# Patient Record
Sex: MALE | Race: WHITE | NOT HISPANIC OR LATINO | Employment: OTHER | URBAN - METROPOLITAN AREA
[De-identification: names, ages, dates, MRNs, and addresses within clinical notes are randomized per-mention and may not be internally consistent; named-entity substitution may affect disease eponyms.]

---

## 2018-10-21 ENCOUNTER — APPOINTMENT (OUTPATIENT)
Dept: RADIOLOGY | Facility: MEDICAL CENTER | Age: 78
DRG: 253 | End: 2018-10-21
Attending: EMERGENCY MEDICINE
Payer: MEDICARE

## 2018-10-21 ENCOUNTER — HOSPITAL ENCOUNTER (INPATIENT)
Facility: MEDICAL CENTER | Age: 78
LOS: 5 days | DRG: 253 | End: 2018-10-26
Attending: EMERGENCY MEDICINE | Admitting: SURGERY
Payer: MEDICARE

## 2018-10-21 ENCOUNTER — APPOINTMENT (OUTPATIENT)
Dept: RADIOLOGY | Facility: MEDICAL CENTER | Age: 78
DRG: 253 | End: 2018-10-21
Attending: SURGERY
Payer: MEDICARE

## 2018-10-21 DIAGNOSIS — I99.8 ISCHEMIC LEG: ICD-10-CM

## 2018-10-21 PROBLEM — E11.59 TYPE 2 DIABETES MELLITUS WITH CIRCULATORY DISORDER (HCC): Status: ACTIVE | Noted: 2018-10-21

## 2018-10-21 PROBLEM — J44.9 COPD (CHRONIC OBSTRUCTIVE PULMONARY DISEASE) (HCC): Status: ACTIVE | Noted: 2018-10-21

## 2018-10-21 PROBLEM — E11.9 DIABETES (HCC): Status: ACTIVE | Noted: 2018-10-21

## 2018-10-21 PROBLEM — E66.9 OBESITY: Status: ACTIVE | Noted: 2018-10-21

## 2018-10-21 PROBLEM — I48.91 ATRIAL FIBRILLATION (HCC): Status: ACTIVE | Noted: 2018-10-21

## 2018-10-21 PROBLEM — I48.0 PAROXYSMAL ATRIAL FIBRILLATION (HCC): Status: ACTIVE | Noted: 2018-10-21

## 2018-10-21 PROBLEM — I25.10 CORONARY ARTERY DISEASE: Status: ACTIVE | Noted: 2018-10-21

## 2018-10-21 PROBLEM — I10 HYPERTENSION: Status: ACTIVE | Noted: 2018-10-21

## 2018-10-21 PROBLEM — E87.20 LACTIC ACIDOSIS: Status: ACTIVE | Noted: 2018-10-21

## 2018-10-21 LAB
ALBUMIN SERPL BCP-MCNC: 3.9 G/DL (ref 3.2–4.9)
ALBUMIN/GLOB SERPL: 1.3 G/DL
ALP SERPL-CCNC: 107 U/L (ref 30–99)
ALT SERPL-CCNC: 21 U/L (ref 2–50)
ANION GAP SERPL CALC-SCNC: 9 MMOL/L (ref 0–11.9)
APTT PPP: 27.7 SEC (ref 24.7–36)
APTT PPP: 70.5 SEC (ref 24.7–36)
APTT PPP: 82.5 SEC (ref 24.7–36)
AST SERPL-CCNC: 19 U/L (ref 12–45)
BASOPHILS # BLD AUTO: 0.2 % (ref 0–1.8)
BASOPHILS # BLD: 0.02 K/UL (ref 0–0.12)
BILIRUB SERPL-MCNC: 0.7 MG/DL (ref 0.1–1.5)
BUN SERPL-MCNC: 24 MG/DL (ref 8–22)
CALCIUM SERPL-MCNC: 9.3 MG/DL (ref 8.5–10.5)
CHLORIDE SERPL-SCNC: 103 MMOL/L (ref 96–112)
CO2 SERPL-SCNC: 24 MMOL/L (ref 20–33)
CREAT SERPL-MCNC: 1.16 MG/DL (ref 0.5–1.4)
EKG IMPRESSION: NORMAL
EOSINOPHIL # BLD AUTO: 0.02 K/UL (ref 0–0.51)
EOSINOPHIL NFR BLD: 0.2 % (ref 0–6.9)
ERYTHROCYTE [DISTWIDTH] IN BLOOD BY AUTOMATED COUNT: 45.8 FL (ref 35.9–50)
GLOBULIN SER CALC-MCNC: 3 G/DL (ref 1.9–3.5)
GLUCOSE BLD-MCNC: 125 MG/DL (ref 65–99)
GLUCOSE BLD-MCNC: 157 MG/DL (ref 65–99)
GLUCOSE BLD-MCNC: 159 MG/DL (ref 65–99)
GLUCOSE SERPL-MCNC: 269 MG/DL (ref 65–99)
HCT VFR BLD AUTO: 43.1 % (ref 42–52)
HGB BLD-MCNC: 14.5 G/DL (ref 14–18)
IMM GRANULOCYTES # BLD AUTO: 0.05 K/UL (ref 0–0.11)
IMM GRANULOCYTES NFR BLD AUTO: 0.6 % (ref 0–0.9)
INR PPP: 1.06 (ref 0.87–1.13)
LACTATE BLD-SCNC: 2.6 MMOL/L (ref 0.5–2)
LYMPHOCYTES # BLD AUTO: 1.13 K/UL (ref 1–4.8)
LYMPHOCYTES NFR BLD: 13.3 % (ref 22–41)
MCH RBC QN AUTO: 32.6 PG (ref 27–33)
MCHC RBC AUTO-ENTMCNC: 33.6 G/DL (ref 33.7–35.3)
MCV RBC AUTO: 96.9 FL (ref 81.4–97.8)
MONOCYTES # BLD AUTO: 0.41 K/UL (ref 0–0.85)
MONOCYTES NFR BLD AUTO: 4.8 % (ref 0–13.4)
NEUTROPHILS # BLD AUTO: 6.87 K/UL (ref 1.82–7.42)
NEUTROPHILS NFR BLD: 80.9 % (ref 44–72)
NRBC # BLD AUTO: 0 K/UL
NRBC BLD-RTO: 0 /100 WBC
PLATELET # BLD AUTO: 139 K/UL (ref 164–446)
PMV BLD AUTO: 9.9 FL (ref 9–12.9)
POTASSIUM SERPL-SCNC: 4.6 MMOL/L (ref 3.6–5.5)
PROT SERPL-MCNC: 6.9 G/DL (ref 6–8.2)
PROTHROMBIN TIME: 13.9 SEC (ref 12–14.6)
RBC # BLD AUTO: 4.45 M/UL (ref 4.7–6.1)
SODIUM SERPL-SCNC: 136 MMOL/L (ref 135–145)
TROPONIN I SERPL-MCNC: <0.01 NG/ML (ref 0–0.04)
WBC # BLD AUTO: 8.5 K/UL (ref 4.8–10.8)

## 2018-10-21 PROCEDURE — 93005 ELECTROCARDIOGRAM TRACING: CPT | Performed by: EMERGENCY MEDICINE

## 2018-10-21 PROCEDURE — 501838 HCHG SUTURE GENERAL: Performed by: SURGERY

## 2018-10-21 PROCEDURE — 160048 HCHG OR STATISTICAL LEVEL 1-5: Performed by: SURGERY

## 2018-10-21 PROCEDURE — 700111 HCHG RX REV CODE 636 W/ 250 OVERRIDE (IP)

## 2018-10-21 PROCEDURE — 99291 CRITICAL CARE FIRST HOUR: CPT

## 2018-10-21 PROCEDURE — 93926 LOWER EXTREMITY STUDY: CPT | Mod: RT

## 2018-10-21 PROCEDURE — 501837 HCHG SUTURE CV: Performed by: SURGERY

## 2018-10-21 PROCEDURE — 04CK0ZZ EXTIRPATION OF MATTER FROM RIGHT FEMORAL ARTERY, OPEN APPROACH: ICD-10-PCS | Performed by: SURGERY

## 2018-10-21 PROCEDURE — 71045 X-RAY EXAM CHEST 1 VIEW: CPT

## 2018-10-21 PROCEDURE — 500002 HCHG ADHESIVE, DERMABOND: Performed by: SURGERY

## 2018-10-21 PROCEDURE — 85610 PROTHROMBIN TIME: CPT

## 2018-10-21 PROCEDURE — 503339 HCHG DRESSSING PICO: Performed by: SURGERY

## 2018-10-21 PROCEDURE — 160039 HCHG SURGERY MINUTES - EA ADDL 1 MIN LEVEL 3: Performed by: SURGERY

## 2018-10-21 PROCEDURE — 96374 THER/PROPH/DIAG INJ IV PUSH: CPT

## 2018-10-21 PROCEDURE — 700111 HCHG RX REV CODE 636 W/ 250 OVERRIDE (IP): Performed by: EMERGENCY MEDICINE

## 2018-10-21 PROCEDURE — 303105 HCHG CATHETER EXTRA

## 2018-10-21 PROCEDURE — 160009 HCHG ANES TIME/MIN: Performed by: SURGERY

## 2018-10-21 PROCEDURE — 36415 COLL VENOUS BLD VENIPUNCTURE: CPT

## 2018-10-21 PROCEDURE — 73600 X-RAY EXAM OF ANKLE: CPT | Mod: RT

## 2018-10-21 PROCEDURE — 700111 HCHG RX REV CODE 636 W/ 250 OVERRIDE (IP): Performed by: SURGERY

## 2018-10-21 PROCEDURE — 700102 HCHG RX REV CODE 250 W/ 637 OVERRIDE(OP): Performed by: SURGERY

## 2018-10-21 PROCEDURE — 770020 HCHG ROOM/CARE - TELE (206)

## 2018-10-21 PROCEDURE — A9270 NON-COVERED ITEM OR SERVICE: HCPCS | Performed by: SURGERY

## 2018-10-21 PROCEDURE — 84484 ASSAY OF TROPONIN QUANT: CPT

## 2018-10-21 PROCEDURE — 700105 HCHG RX REV CODE 258: Performed by: SURGERY

## 2018-10-21 PROCEDURE — B41F1ZZ FLUOROSCOPY OF RIGHT LOWER EXTREMITY ARTERIES USING LOW OSMOLAR CONTRAST: ICD-10-PCS | Performed by: SURGERY

## 2018-10-21 PROCEDURE — 85730 THROMBOPLASTIN TIME PARTIAL: CPT

## 2018-10-21 PROCEDURE — 700112 HCHG RX REV CODE 229: Performed by: SURGERY

## 2018-10-21 PROCEDURE — 51702 INSERT TEMP BLADDER CATH: CPT

## 2018-10-21 PROCEDURE — 160036 HCHG PACU - EA ADDL 30 MINS PHASE I: Performed by: SURGERY

## 2018-10-21 PROCEDURE — 502594 HCHG SCISSOR HANDLE, HARMONIC ACE: Performed by: SURGERY

## 2018-10-21 PROCEDURE — 94760 N-INVAS EAR/PLS OXIMETRY 1: CPT

## 2018-10-21 PROCEDURE — 160028 HCHG SURGERY MINUTES - 1ST 30 MINS LEVEL 3: Performed by: SURGERY

## 2018-10-21 PROCEDURE — C1757 CATH, THROMBECTOMY/EMBOLECT: HCPCS | Performed by: SURGERY

## 2018-10-21 PROCEDURE — 700101 HCHG RX REV CODE 250

## 2018-10-21 PROCEDURE — 99223 1ST HOSP IP/OBS HIGH 75: CPT | Performed by: INTERNAL MEDICINE

## 2018-10-21 PROCEDURE — 82962 GLUCOSE BLOOD TEST: CPT | Mod: 91

## 2018-10-21 PROCEDURE — 04CM0ZZ EXTIRPATION OF MATTER FROM RIGHT POPLITEAL ARTERY, OPEN APPROACH: ICD-10-PCS | Performed by: SURGERY

## 2018-10-21 PROCEDURE — C1725 CATH, TRANSLUMIN NON-LASER: HCPCS | Performed by: SURGERY

## 2018-10-21 PROCEDURE — 80053 COMPREHEN METABOLIC PANEL: CPT

## 2018-10-21 PROCEDURE — 83605 ASSAY OF LACTIC ACID: CPT

## 2018-10-21 PROCEDURE — 73562 X-RAY EXAM OF KNEE 3: CPT | Mod: RT

## 2018-10-21 PROCEDURE — 160035 HCHG PACU - 1ST 60 MINS PHASE I: Performed by: SURGERY

## 2018-10-21 PROCEDURE — 160002 HCHG RECOVERY MINUTES (STAT): Performed by: SURGERY

## 2018-10-21 PROCEDURE — 700102 HCHG RX REV CODE 250 W/ 637 OVERRIDE(OP): Performed by: INTERNAL MEDICINE

## 2018-10-21 PROCEDURE — 304561 HCHG STAT O2

## 2018-10-21 PROCEDURE — 501445 HCHG STAPLER, SKIN DISP: Performed by: SURGERY

## 2018-10-21 PROCEDURE — 85025 COMPLETE CBC W/AUTO DIFF WBC: CPT

## 2018-10-21 PROCEDURE — A9270 NON-COVERED ITEM OR SERVICE: HCPCS | Performed by: INTERNAL MEDICINE

## 2018-10-21 RX ORDER — DIGOXIN 125 MCG
125 TABLET ORAL EVERY MORNING
Status: ON HOLD | COMMUNITY
End: 2019-11-10

## 2018-10-21 RX ORDER — OMEPRAZOLE 20 MG/1
40 CAPSULE, DELAYED RELEASE ORAL
Status: DISCONTINUED | OUTPATIENT
Start: 2018-10-21 | End: 2018-10-26 | Stop reason: HOSPADM

## 2018-10-21 RX ORDER — WARFARIN SODIUM 2 MG/1
2-2.5 TABLET ORAL EVERY EVENING
Status: ON HOLD | COMMUNITY
End: 2018-10-26

## 2018-10-21 RX ORDER — HEPARIN SODIUM,PORCINE 1000/ML
VIAL (ML) INJECTION
Status: DISCONTINUED | OUTPATIENT
Start: 2018-10-21 | End: 2018-10-21 | Stop reason: HOSPADM

## 2018-10-21 RX ORDER — TRAZODONE HYDROCHLORIDE 50 MG/1
50 TABLET ORAL
COMMUNITY
End: 2019-04-17

## 2018-10-21 RX ORDER — ALBUTEROL SULFATE 90 UG/1
2 AEROSOL, METERED RESPIRATORY (INHALATION) EVERY 4 HOURS PRN
Status: DISCONTINUED | OUTPATIENT
Start: 2018-10-21 | End: 2018-10-26 | Stop reason: HOSPADM

## 2018-10-21 RX ORDER — HYDRALAZINE HYDROCHLORIDE 20 MG/ML
10 INJECTION INTRAMUSCULAR; INTRAVENOUS EVERY 4 HOURS PRN
Status: DISCONTINUED | OUTPATIENT
Start: 2018-10-21 | End: 2018-10-26 | Stop reason: HOSPADM

## 2018-10-21 RX ORDER — DEXTROSE MONOHYDRATE 25 G/50ML
25 INJECTION, SOLUTION INTRAVENOUS
Status: DISCONTINUED | OUTPATIENT
Start: 2018-10-21 | End: 2018-10-21

## 2018-10-21 RX ORDER — ONDANSETRON 4 MG/1
4 TABLET, ORALLY DISINTEGRATING ORAL EVERY 4 HOURS PRN
Status: DISCONTINUED | OUTPATIENT
Start: 2018-10-21 | End: 2018-10-26 | Stop reason: HOSPADM

## 2018-10-21 RX ORDER — MEPERIDINE HYDROCHLORIDE 25 MG/ML
12.5 INJECTION INTRAMUSCULAR; INTRAVENOUS; SUBCUTANEOUS
Status: DISCONTINUED | OUTPATIENT
Start: 2018-10-21 | End: 2018-10-21 | Stop reason: HOSPADM

## 2018-10-21 RX ORDER — DIPHENHYDRAMINE HYDROCHLORIDE 50 MG/ML
12.5 INJECTION INTRAMUSCULAR; INTRAVENOUS
Status: DISCONTINUED | OUTPATIENT
Start: 2018-10-21 | End: 2018-10-21 | Stop reason: HOSPADM

## 2018-10-21 RX ORDER — POLYETHYLENE GLYCOL 3350 17 G/17G
1 POWDER, FOR SOLUTION ORAL
Status: DISCONTINUED | OUTPATIENT
Start: 2018-10-21 | End: 2018-10-26 | Stop reason: HOSPADM

## 2018-10-21 RX ORDER — METOPROLOL TARTRATE 50 MG/1
50 TABLET, FILM COATED ORAL TWICE DAILY
Status: DISCONTINUED | OUTPATIENT
Start: 2018-10-21 | End: 2018-10-24

## 2018-10-21 RX ORDER — RANITIDINE 150 MG/1
150 TABLET ORAL 2 TIMES DAILY
Status: ON HOLD | COMMUNITY
End: 2019-11-10

## 2018-10-21 RX ORDER — OXYCODONE HCL 5 MG/5 ML
5 SOLUTION, ORAL ORAL
Status: DISCONTINUED | OUTPATIENT
Start: 2018-10-21 | End: 2018-10-21 | Stop reason: HOSPADM

## 2018-10-21 RX ORDER — ONDANSETRON 2 MG/ML
4 INJECTION INTRAMUSCULAR; INTRAVENOUS EVERY 4 HOURS PRN
Status: DISCONTINUED | OUTPATIENT
Start: 2018-10-21 | End: 2018-10-26 | Stop reason: HOSPADM

## 2018-10-21 RX ORDER — TRAZODONE HYDROCHLORIDE 50 MG/1
50 TABLET ORAL
Status: DISCONTINUED | OUTPATIENT
Start: 2018-10-21 | End: 2018-10-26 | Stop reason: HOSPADM

## 2018-10-21 RX ORDER — METOPROLOL TARTRATE 50 MG/1
50 TABLET, FILM COATED ORAL 2 TIMES DAILY
Status: ON HOLD | COMMUNITY
End: 2018-10-26

## 2018-10-21 RX ORDER — LISINOPRIL 10 MG/1
5 TABLET ORAL
Status: DISCONTINUED | OUTPATIENT
Start: 2018-10-21 | End: 2018-10-24

## 2018-10-21 RX ORDER — GLIMEPIRIDE 4 MG/1
4 TABLET ORAL
Status: DISCONTINUED | OUTPATIENT
Start: 2018-10-21 | End: 2018-10-26 | Stop reason: HOSPADM

## 2018-10-21 RX ORDER — BACITRACIN 50000 [IU]/1
INJECTION, POWDER, FOR SOLUTION INTRAMUSCULAR
Status: DISCONTINUED | OUTPATIENT
Start: 2018-10-21 | End: 2018-10-21 | Stop reason: HOSPADM

## 2018-10-21 RX ORDER — OXYCODONE HYDROCHLORIDE 5 MG/1
5 TABLET ORAL
Status: DISCONTINUED | OUTPATIENT
Start: 2018-10-21 | End: 2018-10-21 | Stop reason: HOSPADM

## 2018-10-21 RX ORDER — HEPARIN SODIUM 1000 [USP'U]/ML
3800 INJECTION, SOLUTION INTRAVENOUS; SUBCUTANEOUS PRN
Status: DISCONTINUED | OUTPATIENT
Start: 2018-10-21 | End: 2018-10-21

## 2018-10-21 RX ORDER — HEPARIN SODIUM 1000 [USP'U]/ML
7000 INJECTION, SOLUTION INTRAVENOUS; SUBCUTANEOUS ONCE
Status: DISCONTINUED | OUTPATIENT
Start: 2018-10-21 | End: 2018-10-21

## 2018-10-21 RX ORDER — OXYCODONE HYDROCHLORIDE 5 MG/1
5 TABLET ORAL
Status: DISCONTINUED | OUTPATIENT
Start: 2018-10-21 | End: 2018-10-26 | Stop reason: HOSPADM

## 2018-10-21 RX ORDER — CEFAZOLIN SODIUM 2 G/100ML
2 INJECTION, SOLUTION INTRAVENOUS EVERY 8 HOURS
Status: COMPLETED | OUTPATIENT
Start: 2018-10-21 | End: 2018-10-22

## 2018-10-21 RX ORDER — OMEPRAZOLE 20 MG/1
40 CAPSULE, DELAYED RELEASE ORAL
Status: ON HOLD | COMMUNITY
End: 2019-11-10

## 2018-10-21 RX ORDER — HYDROMORPHONE HYDROCHLORIDE 2 MG/ML
0.2 INJECTION, SOLUTION INTRAMUSCULAR; INTRAVENOUS; SUBCUTANEOUS
Status: DISCONTINUED | OUTPATIENT
Start: 2018-10-21 | End: 2018-10-21 | Stop reason: HOSPADM

## 2018-10-21 RX ORDER — DOCUSATE SODIUM 100 MG/1
100 CAPSULE, LIQUID FILLED ORAL 2 TIMES DAILY
Status: DISCONTINUED | OUTPATIENT
Start: 2018-10-21 | End: 2018-10-26 | Stop reason: HOSPADM

## 2018-10-21 RX ORDER — HALOPERIDOL 5 MG/ML
1 INJECTION INTRAMUSCULAR EVERY 6 HOURS PRN
Status: DISCONTINUED | OUTPATIENT
Start: 2018-10-21 | End: 2018-10-26 | Stop reason: HOSPADM

## 2018-10-21 RX ORDER — GLIMEPIRIDE 4 MG/1
4 TABLET ORAL 2 TIMES DAILY
COMMUNITY
End: 2019-04-17

## 2018-10-21 RX ORDER — LISINOPRIL 5 MG/1
5 TABLET ORAL EVERY MORNING
Status: ON HOLD | COMMUNITY
End: 2018-10-26

## 2018-10-21 RX ORDER — SCOLOPAMINE TRANSDERMAL SYSTEM 1 MG/1
1 PATCH, EXTENDED RELEASE TRANSDERMAL
Status: DISCONTINUED | OUTPATIENT
Start: 2018-10-21 | End: 2018-10-26 | Stop reason: HOSPADM

## 2018-10-21 RX ORDER — SODIUM CHLORIDE, SODIUM LACTATE, POTASSIUM CHLORIDE, CALCIUM CHLORIDE 600; 310; 30; 20 MG/100ML; MG/100ML; MG/100ML; MG/100ML
INJECTION, SOLUTION INTRAVENOUS CONTINUOUS
Status: DISCONTINUED | OUTPATIENT
Start: 2018-10-21 | End: 2018-10-24

## 2018-10-21 RX ORDER — DEXAMETHASONE SODIUM PHOSPHATE 4 MG/ML
4 INJECTION, SOLUTION INTRA-ARTICULAR; INTRALESIONAL; INTRAMUSCULAR; INTRAVENOUS; SOFT TISSUE
Status: DISCONTINUED | OUTPATIENT
Start: 2018-10-21 | End: 2018-10-26 | Stop reason: HOSPADM

## 2018-10-21 RX ORDER — ACETAMINOPHEN 325 MG/1
650 TABLET ORAL EVERY 6 HOURS PRN
Status: DISCONTINUED | OUTPATIENT
Start: 2018-10-21 | End: 2018-10-21

## 2018-10-21 RX ORDER — MORPHINE SULFATE 4 MG/ML
4 INJECTION, SOLUTION INTRAMUSCULAR; INTRAVENOUS
Status: DISCONTINUED | OUTPATIENT
Start: 2018-10-21 | End: 2018-10-26 | Stop reason: HOSPADM

## 2018-10-21 RX ORDER — BISACODYL 10 MG
10 SUPPOSITORY, RECTAL RECTAL
Status: DISCONTINUED | OUTPATIENT
Start: 2018-10-21 | End: 2018-10-26 | Stop reason: HOSPADM

## 2018-10-21 RX ORDER — MORPHINE SULFATE 4 MG/ML
1-2 INJECTION, SOLUTION INTRAMUSCULAR; INTRAVENOUS
Status: DISCONTINUED | OUTPATIENT
Start: 2018-10-21 | End: 2018-10-24

## 2018-10-21 RX ORDER — ONDANSETRON 2 MG/ML
4 INJECTION INTRAMUSCULAR; INTRAVENOUS
Status: DISCONTINUED | OUTPATIENT
Start: 2018-10-21 | End: 2018-10-21 | Stop reason: HOSPADM

## 2018-10-21 RX ORDER — ONDANSETRON 2 MG/ML
4 INJECTION INTRAMUSCULAR; INTRAVENOUS EVERY 4 HOURS PRN
Status: DISCONTINUED | OUTPATIENT
Start: 2018-10-21 | End: 2018-10-21

## 2018-10-21 RX ORDER — SODIUM CHLORIDE, SODIUM LACTATE, POTASSIUM CHLORIDE, CALCIUM CHLORIDE 600; 310; 30; 20 MG/100ML; MG/100ML; MG/100ML; MG/100ML
INJECTION, SOLUTION INTRAVENOUS CONTINUOUS
Status: DISCONTINUED | OUTPATIENT
Start: 2018-10-21 | End: 2018-10-21 | Stop reason: HOSPADM

## 2018-10-21 RX ORDER — AMOXICILLIN 250 MG
2 CAPSULE ORAL 2 TIMES DAILY
Status: DISCONTINUED | OUTPATIENT
Start: 2018-10-21 | End: 2018-10-26 | Stop reason: HOSPADM

## 2018-10-21 RX ORDER — DIGOXIN 125 MCG
125 TABLET ORAL DAILY
Status: DISCONTINUED | OUTPATIENT
Start: 2018-10-21 | End: 2018-10-26 | Stop reason: HOSPADM

## 2018-10-21 RX ORDER — OXYCODONE HCL 5 MG/5 ML
10 SOLUTION, ORAL ORAL
Status: DISCONTINUED | OUTPATIENT
Start: 2018-10-21 | End: 2018-10-21 | Stop reason: HOSPADM

## 2018-10-21 RX ORDER — HALOPERIDOL 5 MG/ML
1 INJECTION INTRAMUSCULAR
Status: DISCONTINUED | OUTPATIENT
Start: 2018-10-21 | End: 2018-10-21 | Stop reason: HOSPADM

## 2018-10-21 RX ORDER — FINASTERIDE 5 MG/1
5 TABLET, FILM COATED ORAL DAILY
Status: DISCONTINUED | OUTPATIENT
Start: 2018-10-21 | End: 2018-10-26 | Stop reason: HOSPADM

## 2018-10-21 RX ORDER — DEXTROSE MONOHYDRATE 25 G/50ML
25 INJECTION, SOLUTION INTRAVENOUS
Status: DISCONTINUED | OUTPATIENT
Start: 2018-10-21 | End: 2018-10-26 | Stop reason: HOSPADM

## 2018-10-21 RX ORDER — LABETALOL HYDROCHLORIDE 5 MG/ML
10 INJECTION, SOLUTION INTRAVENOUS EVERY 4 HOURS PRN
Status: DISCONTINUED | OUTPATIENT
Start: 2018-10-21 | End: 2018-10-26 | Stop reason: HOSPADM

## 2018-10-21 RX ORDER — BUPIVACAINE HYDROCHLORIDE AND EPINEPHRINE 5; 5 MG/ML; UG/ML
INJECTION, SOLUTION EPIDURAL; INTRACAUDAL; PERINEURAL
Status: DISCONTINUED | OUTPATIENT
Start: 2018-10-21 | End: 2018-10-21 | Stop reason: HOSPADM

## 2018-10-21 RX ORDER — HYDROCODONE BITARTRATE AND ACETAMINOPHEN 5; 325 MG/1; MG/1
1-2 TABLET ORAL EVERY 6 HOURS PRN
Status: DISCONTINUED | OUTPATIENT
Start: 2018-10-21 | End: 2018-10-21

## 2018-10-21 RX ORDER — ACETAMINOPHEN 325 MG/1
325 TABLET ORAL EVERY 4 HOURS PRN
Status: DISCONTINUED | OUTPATIENT
Start: 2018-10-21 | End: 2018-10-26 | Stop reason: HOSPADM

## 2018-10-21 RX ORDER — FINASTERIDE 5 MG/1
5 TABLET, FILM COATED ORAL EVERY MORNING
COMMUNITY

## 2018-10-21 RX ORDER — HYDROMORPHONE HYDROCHLORIDE 2 MG/ML
0.1 INJECTION, SOLUTION INTRAMUSCULAR; INTRAVENOUS; SUBCUTANEOUS
Status: DISCONTINUED | OUTPATIENT
Start: 2018-10-21 | End: 2018-10-21 | Stop reason: HOSPADM

## 2018-10-21 RX ORDER — TRAMADOL HYDROCHLORIDE 50 MG/1
50-100 TABLET ORAL 3 TIMES DAILY PRN
COMMUNITY
End: 2018-12-17

## 2018-10-21 RX ORDER — OXYCODONE HYDROCHLORIDE 5 MG/1
10 TABLET ORAL
Status: DISCONTINUED | OUTPATIENT
Start: 2018-10-21 | End: 2018-10-21 | Stop reason: HOSPADM

## 2018-10-21 RX ORDER — INSULIN GLARGINE 100 [IU]/ML
20 INJECTION, SOLUTION SUBCUTANEOUS EVERY EVENING
Status: DISCONTINUED | OUTPATIENT
Start: 2018-10-21 | End: 2018-10-26 | Stop reason: HOSPADM

## 2018-10-21 RX ORDER — HYDROMORPHONE HYDROCHLORIDE 2 MG/ML
0.4 INJECTION, SOLUTION INTRAMUSCULAR; INTRAVENOUS; SUBCUTANEOUS
Status: DISCONTINUED | OUTPATIENT
Start: 2018-10-21 | End: 2018-10-21 | Stop reason: HOSPADM

## 2018-10-21 RX ORDER — OXYCODONE HYDROCHLORIDE 5 MG/1
10 TABLET ORAL
Status: DISCONTINUED | OUTPATIENT
Start: 2018-10-21 | End: 2018-10-26 | Stop reason: HOSPADM

## 2018-10-21 RX ADMIN — DIGOXIN 125 MCG: 125 TABLET ORAL at 17:34

## 2018-10-21 RX ADMIN — HEPARIN SODIUM 1450 UNITS/HR: 5000 INJECTION, SOLUTION INTRAVENOUS at 21:26

## 2018-10-21 RX ADMIN — OXYCODONE HYDROCHLORIDE 10 MG: 5 TABLET ORAL at 21:11

## 2018-10-21 RX ADMIN — MORPHINE SULFATE 2 MG: 4 INJECTION INTRAVENOUS at 10:01

## 2018-10-21 RX ADMIN — SODIUM CHLORIDE, POTASSIUM CHLORIDE, SODIUM LACTATE AND CALCIUM CHLORIDE: 600; 310; 30; 20 INJECTION, SOLUTION INTRAVENOUS at 21:26

## 2018-10-21 RX ADMIN — OXYCODONE HYDROCHLORIDE 10 MG: 5 TABLET ORAL at 09:30

## 2018-10-21 RX ADMIN — CEFAZOLIN SODIUM 2 G: 2 INJECTION, SOLUTION INTRAVENOUS at 21:10

## 2018-10-21 RX ADMIN — INSULIN GLARGINE 20 UNITS: 100 INJECTION, SOLUTION SUBCUTANEOUS at 18:38

## 2018-10-21 RX ADMIN — INSULIN HUMAN 2 UNITS: 100 INJECTION, SOLUTION PARENTERAL at 17:29

## 2018-10-21 RX ADMIN — MORPHINE SULFATE 2 MG: 4 INJECTION INTRAVENOUS at 12:57

## 2018-10-21 RX ADMIN — GLIMEPIRIDE 4 MG: 4 TABLET ORAL at 13:09

## 2018-10-21 RX ADMIN — THERA TABS 1 TABLET: TAB at 12:59

## 2018-10-21 RX ADMIN — MORPHINE SULFATE 2 MG: 4 INJECTION INTRAVENOUS at 21:11

## 2018-10-21 RX ADMIN — SODIUM CHLORIDE, POTASSIUM CHLORIDE, SODIUM LACTATE AND CALCIUM CHLORIDE: 600; 310; 30; 20 INJECTION, SOLUTION INTRAVENOUS at 09:54

## 2018-10-21 RX ADMIN — LISINOPRIL 5 MG: 10 TABLET ORAL at 12:59

## 2018-10-21 RX ADMIN — INSULIN HUMAN 2 UNITS: 100 INJECTION, SOLUTION PARENTERAL at 21:07

## 2018-10-21 RX ADMIN — CEFAZOLIN SODIUM 2 G: 2 INJECTION, SOLUTION INTRAVENOUS at 12:59

## 2018-10-21 RX ADMIN — HEPARIN SODIUM 1450 UNITS: 5000 INJECTION, SOLUTION INTRAVENOUS at 02:27

## 2018-10-21 RX ADMIN — MORPHINE SULFATE 2 MG: 4 INJECTION INTRAVENOUS at 17:39

## 2018-10-21 RX ADMIN — DOCUSATE SODIUM 100 MG: 100 CAPSULE, LIQUID FILLED ORAL at 17:34

## 2018-10-21 RX ADMIN — MORPHINE SULFATE 2 MG: 4 INJECTION INTRAVENOUS at 14:52

## 2018-10-21 RX ADMIN — OMEPRAZOLE 40 MG: 20 CAPSULE, DELAYED RELEASE ORAL at 12:59

## 2018-10-21 RX ADMIN — FINASTERIDE 5 MG: 5 TABLET, FILM COATED ORAL at 12:59

## 2018-10-21 RX ADMIN — OXYCODONE HYDROCHLORIDE 10 MG: 5 TABLET ORAL at 17:34

## 2018-10-21 RX ADMIN — METOPROLOL TARTRATE 50 MG: 50 TABLET ORAL at 12:59

## 2018-10-21 RX ADMIN — HEPARIN SODIUM 1450 UNITS/HR: 5000 INJECTION, SOLUTION INTRAVENOUS at 09:54

## 2018-10-21 RX ADMIN — OXYCODONE HYDROCHLORIDE 10 MG: 5 TABLET ORAL at 14:52

## 2018-10-21 ASSESSMENT — PAIN SCALES - GENERAL
PAINLEVEL_OUTOF10: 0
PAINLEVEL_OUTOF10: 8
PAINLEVEL_OUTOF10: 0
PAINLEVEL_OUTOF10: 0
PAINLEVEL_OUTOF10: 7
PAINLEVEL_OUTOF10: 0
PAINLEVEL_OUTOF10: 8
PAINLEVEL_OUTOF10: 0
PAINLEVEL_OUTOF10: 0

## 2018-10-21 ASSESSMENT — ENCOUNTER SYMPTOMS
SHORTNESS OF BREATH: 0
ABDOMINAL PAIN: 0
EYE PAIN: 0
STRIDOR: 0
NECK PAIN: 0
COUGH: 0
DIARRHEA: 0
SPUTUM PRODUCTION: 0
SEIZURES: 0
INSOMNIA: 0
HEADACHES: 0
EYE DISCHARGE: 0
MYALGIAS: 0
BACK PAIN: 0
NERVOUS/ANXIOUS: 0
NAUSEA: 0
ORTHOPNEA: 0
DEPRESSION: 0
EYE REDNESS: 0
HEARTBURN: 0
PALPITATIONS: 0
WEIGHT LOSS: 0
DIZZINESS: 0
CHILLS: 0
FOCAL WEAKNESS: 0
VOMITING: 0
FEVER: 0
BLURRED VISION: 0

## 2018-10-21 ASSESSMENT — PATIENT HEALTH QUESTIONNAIRE - PHQ9
SUM OF ALL RESPONSES TO PHQ9 QUESTIONS 1 AND 2: 0
2. FEELING DOWN, DEPRESSED, IRRITABLE, OR HOPELESS: NOT AT ALL
1. LITTLE INTEREST OR PLEASURE IN DOING THINGS: NOT AT ALL
1. LITTLE INTEREST OR PLEASURE IN DOING THINGS: NOT AT ALL
2. FEELING DOWN, DEPRESSED, IRRITABLE, OR HOPELESS: NOT AT ALL
SUM OF ALL RESPONSES TO PHQ9 QUESTIONS 1 AND 2: 0

## 2018-10-21 ASSESSMENT — COPD QUESTIONNAIRES
DO YOU EVER COUGH UP ANY MUCUS OR PHLEGM?: YES, EVERY DAY
COPD SCREENING SCORE: 9
HAVE YOU SMOKED AT LEAST 100 CIGARETTES IN YOUR ENTIRE LIFE: YES
DURING THE PAST 4 WEEKS HOW MUCH DID YOU FEEL SHORT OF BREATH: MOST  OR ALL OF THE TIME

## 2018-10-21 ASSESSMENT — COGNITIVE AND FUNCTIONAL STATUS - GENERAL
MOBILITY SCORE: 21
CLIMB 3 TO 5 STEPS WITH RAILING: A LITTLE
SUGGESTED CMS G CODE MODIFIER MOBILITY: CJ
STANDING UP FROM CHAIR USING ARMS: A LITTLE
SUGGESTED CMS G CODE MODIFIER DAILY ACTIVITY: CJ
DAILY ACTIVITIY SCORE: 20
HELP NEEDED FOR BATHING: A LITTLE
TOILETING: A LITTLE
WALKING IN HOSPITAL ROOM: A LITTLE
DRESSING REGULAR LOWER BODY CLOTHING: A LITTLE
DRESSING REGULAR UPPER BODY CLOTHING: A LITTLE

## 2018-10-21 ASSESSMENT — LIFESTYLE VARIABLES
EVER_SMOKED: YES
ALCOHOL_USE: NO

## 2018-10-21 NOTE — CARE PLAN
Problem: Venous Thromboembolism (VTW)/Deep Vein Thrombosis (DVT) Prevention:  Goal: Patient will participate in Venous Thrombosis (VTE)/Deep Vein Thrombosis (DVT)Prevention Measures  Outcome: PROGRESSING AS EXPECTED  Ensure hep gtt flowing at optimal rate according to ptt values    Problem: Pain Management  Goal: Pain level will decrease to patient's comfort goal  Outcome: PROGRESSING AS EXPECTED  Give prns as needed to maintain comfortable pain level

## 2018-10-21 NOTE — PROGRESS NOTES
[facility] is [facility] ([facility]) [phone].

## 2018-10-21 NOTE — CONSULTS
DATE OF SERVICE:  10/21/2018    ER CONSULTATION    REFERRING PHYSICIAN:  Marlon العراقي MD, emergency room physician.    CHIEF COMPLAINT:  Right leg pain.    HISTORY OF PRESENT ILLNESS:  The patient is an 78-year-old male with multiple   medical problems, who presented to [facility] with right leg pain, which   per report, started yesterday at 8:00 p.m.  Per the daughter, patient has been   complaining of cramps in the right leg, on and off for quite some time.  The   patient was found to have an ischemic leg and transferred to [facility].  I was   asked to see patient.  Patient has atrial fibrillation.  He is on Warfarin but his INR is subtherapeutic.    Of note, patient was recently admitted to [facility] for COPD exacerbation and was transferred to   [facility] for pulmonary rehab.    PAST MEDICAL HISTORY:  Significant for coronary artery disease status post   stenting 5 years ago, COPD on 3 liters oxygen, atrial fibrillation, hypertension, and diabetes   mellitus.    ALLERGIES:  GABAPENTIN.    HOME MEDICATIONS:  Digoxin, atorvastatin, insulin, and warfarin (it should be   noted that his INR was subtherapeutic).    SOCIAL HISTORY:  Significant for past history of tobacco and alcohol abuse.    Per family members, patient stopped smoking and abuse alcohol a few years ago.    There is no history of illicit drug use.    FAMILY HISTORY:  Significant for hypertension and diabetes mellitus.    REVIEW OF SYSTEMS:  Significant for right leg pain.  The remaining of the   review of systems is not obtainable secondary to patient being confused.    PHYSICAL EXAMINATION:  GENERAL:  Patient is an overweight male who is crying.  LUNGS:  Have decreased breath sounds at bases.  CARDIOVASCULAR:  Showed irregular rate.  ABDOMEN:  Soft, nondistended, nontender, protuberant.  EXTREMITIES:  Lower extremity exam showed the right lower extremity be cool   and slightly mottled in appearance.  The left femoral and pedal arteries had    multiphasic Doppler flow signals.  There is bruising on the lateral part of   both lower legs, left greater than right.  NEUROLOGIC:  Grossly nonfocal.    LABORATORY DATA:  Reviewed.    RADIOLOGY STUDY:  Also reviewed.    ASSESSMENT:  1.  Critical right leg ischemia.  2.  Atrial fibrillation with subtherapeutic anticoagulation.  3.  Chronic obstructive pulmonary disease, on 3 liters of oxygen.  4.  Coronary artery disease.  5.  Hypertension.  6.  Diabetes mellitus.    PLAN:  I had a long discussion with patient and family members.  Study results   were discussed with them.  I recommend that patient undergo right leg   thrombectomy, possible bypass, possible angiogram and fasciotomy.    Risks and benefits of the procedure were discussed.  Risks include but not   limited to bleeding, infection, seroma formation, seroma leak, contrast   reaction, contrast-induced kidney failure, respiratory failure requiring   mechanical ventilation postoperatively, heart attack, and perioperative   anesthetic complication.  The alternative of not having the procedure done was   also discussed.  With this option, there is definitive risk of limb loss.    Patient and his family members indicated understanding and would like patient   to undergo surgery.  All questions were answered.  OR was contacted.    Thank you for the consultation.       ____________________________________     MD TORRES XIONG / EVER    DD:  10/21/2018 02:58:55  DT:  10/21/2018 03:23:42    D#:  [id number]  Job#:  [id number]    cc: Marlon العراقي MD

## 2018-10-21 NOTE — OR NURSING
Pt awake and alert when approached. Pt was asked if he was having pain and denied pain. No change with initial assessment of le pulses and r groin dsg.  Report to rn awaiting room.

## 2018-10-21 NOTE — OR NURSING
Pt resting comfortably. Family in waiting area. Updated on poc pt /resting/sleeping and no distress.family encouraged to allow pt to rest.

## 2018-10-21 NOTE — H&P
Hospital Medicine History and Physical      Date of Service  10/21/2018    Chief Complaint  Chief Complaint   Patient presents with   • Cold Extremity     RLE arterial occlusion.       History of Presenting Illness  Ap is a 78 y.o. male PMH of COPD, SHF, DM who presents with right leg ischemia.  The patient was seen by vascular surgeon and immediately taken to the OR where he had multiple clot removal from his right leg.  The patient was seen and examined after the procedure in his room.  Currently the patient denies any leg pain.  His right leg is warm now.  He is on heparin infusion.  Patient was educated regarding lifestyle changes including weight loss and diet and exercise education.      Primary Care Physician  No primary care provider on file.      Code Status  Full code    Review of Systems  Review of Systems   Constitutional: Negative for chills, fever and weight loss.   HENT: Negative for congestion and nosebleeds.    Eyes: Negative for blurred vision, pain, discharge and redness.   Respiratory: Negative for cough, sputum production, shortness of breath and stridor.    Cardiovascular: Negative for chest pain, palpitations and orthopnea.   Gastrointestinal: Negative for abdominal pain, diarrhea, heartburn, nausea and vomiting.   Genitourinary: Negative for dysuria, frequency and urgency.   Musculoskeletal: Negative for back pain, myalgias and neck pain.        Right leg pain   Skin: Negative for itching and rash.   Neurological: Negative for dizziness, focal weakness, seizures and headaches.   Psychiatric/Behavioral: Negative for depression. The patient is not nervous/anxious and does not have insomnia.      Please see HPI, all other systems were reviewed and are negative (AMA/CMS criteria)     Past Medical History  Past Medical History:   Diagnosis Date   • Chronic obstructive pulmonary disease (HCC)    • Congestive heart failure (HCC)    • Diabetes (HCC)        Surgical History  No past surgical  history on file.    Medications  No current facility-administered medications on file prior to encounter.      No current outpatient prescriptions on file prior to encounter.     Family History  History reviewed. No pertinent family history.      Social History  Social History   Substance Use Topics   • Smoking status: Unknown If Ever Smoked   • Smokeless tobacco: Not on file   • Alcohol use Not on file      Comment: navin       Allergies  Allergies   Allergen Reactions   • Gabapentin         Physical Exam  Laboratory   Hemodynamics  Temp (24hrs), Av.9 °C (98.5 °F), Min:36.7 °C (98 °F), Max:37.3 °C (99.1 °F)   Temperature: 36.9 °C (98.5 °F)  Pulse  Av.8  Min: 72  Max: 90 Heart Rate (Monitored): 80  Blood Pressure : (!) 163/85, Arterial BP: 129/40, NIBP: 153/69      Respiratory      Respiration: (!) 22, Pulse Oximetry: 99 %             Physical Exam   Constitutional: He is oriented to person, place, and time. No distress.   HENT:   Head: Normocephalic and atraumatic.   Mouth/Throat: Oropharynx is clear and moist.   Eyes: Pupils are equal, round, and reactive to light. Conjunctivae and EOM are normal.   Neck: Normal range of motion. Neck supple. No tracheal deviation present. No thyromegaly present.   Cardiovascular: Normal rate and regular rhythm.    No murmur heard.  Pulmonary/Chest: Effort normal and breath sounds normal. No respiratory distress. He has no wheezes.   Abdominal: Soft. Bowel sounds are normal. He exhibits no distension. There is no tenderness.   Musculoskeletal: He exhibits no edema or tenderness.   Neurological: He is alert and oriented to person, place, and time. No cranial nerve deficit.   Skin: Skin is warm and dry. He is not diaphoretic. No erythema.   Psychiatric: He has a normal mood and affect. His behavior is normal. Thought content normal.       Recent Labs      10/21/18   0055   WBC  8.5   RBC  4.45*   HEMOGLOBIN  14.5   HEMATOCRIT  43.1   MCV  96.9   MCH  32.6   MCHC  33.6*   RDW   45.8   PLATELETCT  139*   MPV  9.9     Recent Labs      10/21/18   0055   SODIUM  136   POTASSIUM  4.6   CHLORIDE  103   CO2  24   GLUCOSE  269*   BUN  24*   CREATININE  1.16   CALCIUM  9.3     Recent Labs      10/21/18   0055   ALTSGPT  21   ASTSGOT  19   ALKPHOSPHAT  107*   TBILIRUBIN  0.7   GLUCOSE  269*     Recent Labs      10/21/18   0055   APTT  27.7   INR  1.06             Lab Results   Component Value Date    TROPONINI <0.01 10/21/2018       Imaging  DX-PORTABLE FLUOROSCOPY < 1 HOUR   Final Result         1.  Fluoroscopy for bypass and thrombectomy      US-EXTREMITY ARTERY LOWER UNILAT RIGHT   Final Result      DX-KNEE 3 VIEWS RIGHT   Final Result         1.  Possible patellar subluxation, recommend patellar sunrise view for further evaluation.   2.  No fracture appreciated.      DX-CHEST-PORTABLE (1 VIEW)   Final Result         1.  Pulmonary vascular congestion and mild pulmonary edema   2.  Cardiomegaly      DX-ANKLE 2- VIEWS RIGHT   Final Result         1.  No radiographic evidence of acute traumatic injury.             Assessment/Plan     I anticipate this patient will require at least two midnights for appropriate medical management, necessitating inpatient admission.    Ischemic leg- (present on admission)   Assessment & Plan    Right ischemic leg  Post Extensive clots removal.  Completion angiogram showed patent arterial system with PT dominant run-off.  To restart warfarin once ok by surgery          Lactic acidosis- (present on admission)   Assessment & Plan    Related to ischemic leg        Obesity- (present on admission)   Assessment & Plan    Diet and exercise education        COPD (chronic obstructive pulmonary disease) (HCC)- (present on admission)   Assessment & Plan    Stable        Type 2 diabetes mellitus with circulatory disorder (HCC)- (present on admission)   Assessment & Plan    Holding oral medication  On sliding scale insulin        Paroxysmal atrial fibrillation (HCC)- (present on  admission)   Assessment & Plan    Rate controlled  Continue metoprolol and digoxin  To restart warfarin after the procedure            Prophylaxis:  heparin drip

## 2018-10-21 NOTE — ED TRIAGE NOTES
Chief Complaint   Patient presents with   • Cold Extremity     RLE arterial occlusion.       BP (!) 163/85   Pulse 85   Temp 37.3 °C (99.1 °F)   Resp 20   Ht 1.829 m (6')   Wt 110 kg (242 lb 8.1 oz)   SpO2 95%   BMI 32.89 kg/m²     Pt BIB care flight from [place]. PT lives at nursing home. Presented with RLE pain. Diagnosed with arterial occlusion at outside facility. No pulses palpable in RLE upon arrival. Pt received 125 mcg fentanyl, 1 mg dilaudid and 1.5 mg ativan prior to arrival. Not answering questions at this time. VSS on 4L NC (baseline oxygen need for pt.)

## 2018-10-21 NOTE — OR NURSING
Pt resting comfortably. dsg to right groin cd&i. No drainage. proveena type drain in use small bore cath from site. No visible bleeding or drainage.

## 2018-10-21 NOTE — OR NURSING
Pt received from or via [facility]. Monitors on report from Dr. David and Or Lorene Soliz.  Vs baseline. Heparin gtt to left brachialis 20g Dr. David stated that Dr. Rodriguez wanted this to remain on at this rate while in pacu.    Pt sedated respirations even and relaxed. Red oral airway in use nc at 6 l to oral airway.    no distress.

## 2018-10-21 NOTE — CONSULTS
"Seen and examined.  Dictated, #[id number].    High risk for surgery due to CAD s/p stenting years ago, COPD on 3l oxygen, hypertension, diabetes mellitus, overweight, and advanced age.  Patient was also recently admitted to [facility] for 3 days for COPD exacerbation and was at [facility] for \"pulmonary rehab\" when he developed right leg ischemia.  "

## 2018-10-21 NOTE — OR SURGEON
Immediate Post OP Note    PreOp Diagnosis: Critical right leg ischemia.    PostOp Diagnosis: Same.    Procedure(s):  RIGHT LEG THROMBECTOMY - Wound Class: Clean  ANGIOGRAM - Wound Class: Clean    Surgeon:  Milagro Rodriguez M.D.    Anesthesiologist/Type of Anesthesia:  Anesthesiologist: Tadeo David M.D./General    Surgical Staff:  Circulator: Martínez Ovalle RMAURICIO; Michael De Los Santos R.N.; Dayana Short R.N.  Scrub Person: Neelima Javed    Specimens removed if any:  Clots removed, not sent.    Estimated Blood Loss: 50ml.    IVF: 700ml.    Contrast: 7.5ml Visipaque.    Findings: Extensive clots.  Completion angiogram showed patent arterial system with PT dominant run-off.    Complications: None.    Dictated, #[id number].        10/21/2018 5:00 AM Milagro Rodriguez M.D.

## 2018-10-21 NOTE — PROGRESS NOTES
2 RN skin check complete at 0730 this AM with [facility]. R lower leg has one small scab. R groin is noted to wound vac with mechanism taped to lower abdomen. Suction is tight, dressing is intact. Pt also noted to have healed scars to abdomen from past abdominal surgeries. All other bony prominences assessed, skin is intact. Pt wears oxygen at home, area behind ears are also intact. No skin breakdown noted.

## 2018-10-21 NOTE — ED NOTES
Does the patient present to the ED because of a fall? NO    Is the patient 70 yrs or older? NO     Does the patient have an altered mental status? NO     Does the patient have impaired mobility? YES     Does the nurse feel the patient is a fall risk due to bowel/bladder incontinence, diarrhea, urinary frequency/urgency, leg weakness, orthostatic hypotension, dizziness/vertigo, or narcotic use? NO     YES to any  = HIGH and Universal fall risk interventions implemented:     Unable to place yellow socks. Stambaugh sign at door.     Familiarize pt with environment  Call light within reach and demonstrated use  Personal possessions within reach of pt  Stretcher in low position with wheels locked  Keep floor surfaces clean and dry  Keep care area uncluttered  Hourly assessment for pain, needs, position change, and call light access

## 2018-10-21 NOTE — ED PROVIDER NOTES
ED Provider Note    ED Provider Note      Primary care provider: No primary care provider on file.    CHIEF COMPLAINT  Chief Complaint   Patient presents with   • Cold Extremity     RLE arterial occlusion.       HPI  Neil De Souza is a 78 y.o. male who presents to the Emergency Department with chief complaint of cold right lower extremity.  Patient went to outside hospital where he was given Ativan Dilaudid Zofran there was concern for probable arterial occlusion in the right lower extremity and he was transferred here for further evaluation and treatment.  Patient had no laboratory evaluation there are no imaging there.  Patient arrives and is extremely somnolent difficult to arouse and cannot portray any of his complaints.  His daughter and granddaughter are at bedside with him that state that this is completely out of the ordinary for him that he is normally very sharp and with it and communicates normally.  Further history of present illness limited by altered mental status.    REVIEW OF SYSTEMS  As per HPI otherwise limited by altered mental status    PAST MEDICAL HISTORY   has a past medical history of Chronic obstructive pulmonary disease (HCC); Congestive heart failure (HCC); and Diabetes (HCC).    SURGICAL HISTORY  patient denies any surgical history    SOCIAL HISTORY  Social History   Substance Use Topics   • Smoking status: Unknown If Ever Smoked   • Smokeless tobacco: Not on file   • Alcohol use Not on file      Comment: navin      History   Drug use: Unknown     Comment: navin       FAMILY HISTORY  Non-Contributory    CURRENT MEDICATIONS  Home Medications     Reviewed by Dayana Short R.N. (Registered Nurse) on 10/21/18 at 0323  Med List Status: Not Addressed   Medication Last Dose Status   heparin infusion 25,000 units in 500 ml 0.45% nacl  Active   heparin injection 3,800 Units  Active   TASK REMINDER ... warfarin (COUMADIN)  Active                ALLERGIES  Allergies   Allergen Reactions   •  Gabapentin        PHYSICAL EXAM  VITAL SIGNS: BP (!) 163/85   Pulse 85   Temp 37.3 °C (99.1 °F)   Resp 20   Ht 1.829 m (6')   Wt 110 kg (242 lb 8.1 oz)   SpO2 95%   BMI 32.89 kg/m²   Pulse ox interpretation: I interpret this pulse ox as normal.  Constitutional: Somnolent difficult to arouse severe distress  HEENT: Atraumatic normocephalic, pupils are equal round reactive to light extraocular movements are intact. The nares is clear, external ears are normal, mouth shows moist mucous membranes  Neck: Supple, no JVD no tracheal deviation  Cardiovascular: Regular rate and rhythm no murmur rub or gallop 2+ no palpable pulses in right lower extremity past the knee, thready pulses in the periphery in other extremities  Thorax & Lungs: No respiratory distress, no wheezes rales or rhonchi, No chest tenderness.   GI: Soft nontender nondistended positive bowel sounds, no peritoneal signs  Skin: Warm dry no acute rash or lesion  Musculoskeletal: Moving all extremities with full range and 5 of 5 strength, no acute  deformity, the right lower extremity is slightly cyanotic and cold to the touch from the level of the knee distally.  Neurologic: Cranial nerves III through XII are grossly intact, no sensory deficit, no cerebellar dysfunction   Psychiatric: Altered difficult to arouse      DIAGNOSTIC STUDIES / PROCEDURES  LABS      Results for orders placed or performed during the hospital encounter of 10/21/18   CBC WITH DIFFERENTIAL   Result Value Ref Range    WBC 8.5 4.8 - 10.8 K/uL    RBC 4.45 (L) 4.70 - 6.10 M/uL    Hemoglobin 14.5 14.0 - 18.0 g/dL    Hematocrit 43.1 42.0 - 52.0 %    MCV 96.9 81.4 - 97.8 fL    MCH 32.6 27.0 - 33.0 pg    MCHC 33.6 (L) 33.7 - 35.3 g/dL    RDW 45.8 35.9 - 50.0 fL    Platelet Count 139 (L) 164 - 446 K/uL    MPV 9.9 9.0 - 12.9 fL    Neutrophils-Polys 80.90 (H) 44.00 - 72.00 %    Lymphocytes 13.30 (L) 22.00 - 41.00 %    Monocytes 4.80 0.00 - 13.40 %    Eosinophils 0.20 0.00 - 6.90 %     Basophils 0.20 0.00 - 1.80 %    Immature Granulocytes 0.60 0.00 - 0.90 %    Nucleated RBC 0.00 /100 WBC    Neutrophils (Absolute) 6.87 1.82 - 7.42 K/uL    Lymphs (Absolute) 1.13 1.00 - 4.80 K/uL    Monos (Absolute) 0.41 0.00 - 0.85 K/uL    Eos (Absolute) 0.02 0.00 - 0.51 K/uL    Baso (Absolute) 0.02 0.00 - 0.12 K/uL    Immature Granulocytes (abs) 0.05 0.00 - 0.11 K/uL    NRBC (Absolute) 0.00 K/uL   COMP METABOLIC PANEL   Result Value Ref Range    Sodium 136 135 - 145 mmol/L    Potassium 4.6 3.6 - 5.5 mmol/L    Chloride 103 96 - 112 mmol/L    Co2 24 20 - 33 mmol/L    Anion Gap 9.0 0.0 - 11.9    Glucose 269 (H) 65 - 99 mg/dL    Bun 24 (H) 8 - 22 mg/dL    Creatinine 1.16 0.50 - 1.40 mg/dL    Calcium 9.3 8.5 - 10.5 mg/dL    AST(SGOT) 19 12 - 45 U/L    ALT(SGPT) 21 2 - 50 U/L    Alkaline Phosphatase 107 (H) 30 - 99 U/L    Total Bilirubin 0.7 0.1 - 1.5 mg/dL    Albumin 3.9 3.2 - 4.9 g/dL    Total Protein 6.9 6.0 - 8.2 g/dL    Globulin 3.0 1.9 - 3.5 g/dL    A-G Ratio 1.3 g/dL   PROTHROMBIN TIME   Result Value Ref Range    PT 13.9 12.0 - 14.6 sec    INR 1.06 0.87 - 1.13   APTT   Result Value Ref Range    APTT 27.7 24.7 - 36.0 sec   TROPONIN   Result Value Ref Range    Troponin I <0.01 0.00 - 0.04 ng/mL   LACTIC ACID   Result Value Ref Range    Lactic Acid 2.6 (H) 0.5 - 2.0 mmol/L   ESTIMATED GFR   Result Value Ref Range    GFR If African American >60 >60 mL/min/1.73 m 2    GFR If Non African American >60 >60 mL/min/1.73 m 2   EKG (NOW)   Result Value Ref Range    Report       [facility] Emergency Dept.    Test Date:  2018-10-21  Pt Name:    QIANA CAGLE              Department: ER  MRN:        [id number]                      Room:        09  Gender:     Male                         Technician: [id number]  :        [id number]                   Requested By:ITZ MALLOY  Order #:    [id number]                    Reading MD:    Measurements  Intervals                                Axis  Rate:       77                            P:  IL:                                      QRS:        46  QRSD:       100                          T:          70  QT:         392  QTc:        444    Interpretive Statements  ATRIAL FIBRILLATION  BORDERLINE T WAVE ABNORMALITIES  No previous ECG available for comparison         All labs reviewed by me.      RADIOLOGY  DX-PORTABLE FLUOROSCOPY < 1 HOUR   Final Result         1.  Fluoroscopy for bypass and thrombectomy      US-EXTREMITY ARTERY LOWER UNILAT RIGHT   Final Result      DX-KNEE 3 VIEWS RIGHT   Final Result         1.  Possible patellar subluxation, recommend patellar sunrise view for further evaluation.   2.  No fracture appreciated.      DX-CHEST-PORTABLE (1 VIEW)   Final Result         1.  Pulmonary vascular congestion and mild pulmonary edema   2.  Cardiomegaly      DX-ANKLE 2- VIEWS RIGHT   Final Result         1.  No radiographic evidence of acute traumatic injury.        The radiologist's interpretation of all radiological studies have been reviewed by me.    COURSE & MEDICAL DECISION MAKING  Pertinent Labs & Imaging studies reviewed. (See chart for details)    12:22 AM - Patient seen and examined at bedside.  Transferred from outside hospital for concern of ischemic leg.  Orders placed for basic laboratory evaluation and arterial duplex of the right lower extremity.  Patient somewhat altered after discussion with family members I think this is secondary to medications administered prior to arrival here will hold off on intubation at this time as patient is DNR.      0152:  Patient found to have arterial occlusion that goes into the pelvis from the right femoral artery this was discussed with vascular surgeon Dr. Rodriguez who requests admission to the hospitalist service and he will began to take patient emergently to the operating room.    [id number] hospitalist paged    [phone]  Patient discussed with Dr. Mcclure.  Patient will be admitted to the hospitalist service and guarded  condition.      Patient taken to operating room for intervention.    Patient noted to have slightly elevated blood pressure likely circumstantial secondary to presenting complaint. Referred to primary care physician for further evaluation.    Critical care:  45 minutes of critical care time was spent with this patient including initial evaluation initial resuscitation the ordering of labs EKGs images and rhythm strip interpretation of such. Interventions based on such. Discussing the case with the patient the patient's family members consulting physicians. Does not include separately billable procedures.      BP (!) 163/85   Pulse 85   Temp 37.3 °C (99.1 °F)   Resp 20   Ht 1.829 m (6')   Wt 110 kg (242 lb 8.1 oz)   SpO2 95%   BMI 32.89 kg/m²             FINAL IMPRESSION  1.  Right lower extremity pain  2.  Right lower extremity arterial occlusion of the level of the common femoral  3.  Ischemic right lower extremity  4.  Lactic acidosis  5.  Altered mental status  6.  Critical care 45 minutes      This dictation has been created using voice recognition software and/or scribes. The accuracy of the dictation is limited by the abilities of the software and the expertise of the scribes. I expect there may be some errors of grammar and possibly content. I made every attempt to manually correct the errors within my dictation. However, errors related to voice recognition software and/or scribes may still exist and should be interpreted within the appropriate context.

## 2018-10-22 LAB
ANION GAP SERPL CALC-SCNC: 8 MMOL/L (ref 0–11.9)
APTT PPP: 54.8 SEC (ref 24.7–36)
BASOPHILS # BLD AUTO: 0.3 % (ref 0–1.8)
BASOPHILS # BLD: 0.03 K/UL (ref 0–0.12)
BUN SERPL-MCNC: 18 MG/DL (ref 8–22)
CALCIUM SERPL-MCNC: 9.1 MG/DL (ref 8.5–10.5)
CHLORIDE SERPL-SCNC: 102 MMOL/L (ref 96–112)
CO2 SERPL-SCNC: 24 MMOL/L (ref 20–33)
CREAT SERPL-MCNC: 1.07 MG/DL (ref 0.5–1.4)
EOSINOPHIL # BLD AUTO: 0.2 K/UL (ref 0–0.51)
EOSINOPHIL NFR BLD: 2.3 % (ref 0–6.9)
ERYTHROCYTE [DISTWIDTH] IN BLOOD BY AUTOMATED COUNT: 47.5 FL (ref 35.9–50)
EST. AVERAGE GLUCOSE BLD GHB EST-MCNC: 229 MG/DL
GLUCOSE BLD-MCNC: 118 MG/DL (ref 65–99)
GLUCOSE BLD-MCNC: 196 MG/DL (ref 65–99)
GLUCOSE BLD-MCNC: 207 MG/DL (ref 65–99)
GLUCOSE BLD-MCNC: 227 MG/DL (ref 65–99)
GLUCOSE SERPL-MCNC: 119 MG/DL (ref 65–99)
HBA1C MFR BLD: 9.6 % (ref 0–5.6)
HCT VFR BLD AUTO: 42.1 % (ref 42–52)
HGB BLD-MCNC: 13.8 G/DL (ref 14–18)
IMM GRANULOCYTES # BLD AUTO: 0.04 K/UL (ref 0–0.11)
IMM GRANULOCYTES NFR BLD AUTO: 0.5 % (ref 0–0.9)
LYMPHOCYTES # BLD AUTO: 2.12 K/UL (ref 1–4.8)
LYMPHOCYTES NFR BLD: 24.6 % (ref 22–41)
MCH RBC QN AUTO: 32.6 PG (ref 27–33)
MCHC RBC AUTO-ENTMCNC: 32.8 G/DL (ref 33.7–35.3)
MCV RBC AUTO: 99.5 FL (ref 81.4–97.8)
MONOCYTES # BLD AUTO: 1.06 K/UL (ref 0–0.85)
MONOCYTES NFR BLD AUTO: 12.3 % (ref 0–13.4)
NEUTROPHILS # BLD AUTO: 5.17 K/UL (ref 1.82–7.42)
NEUTROPHILS NFR BLD: 60 % (ref 44–72)
NRBC # BLD AUTO: 0 K/UL
NRBC BLD-RTO: 0 /100 WBC
PLATELET # BLD AUTO: 110 K/UL (ref 164–446)
PMV BLD AUTO: 10.1 FL (ref 9–12.9)
POTASSIUM SERPL-SCNC: 3.9 MMOL/L (ref 3.6–5.5)
RBC # BLD AUTO: 4.23 M/UL (ref 4.7–6.1)
SODIUM SERPL-SCNC: 134 MMOL/L (ref 135–145)
WBC # BLD AUTO: 8.6 K/UL (ref 4.8–10.8)

## 2018-10-22 PROCEDURE — A9270 NON-COVERED ITEM OR SERVICE: HCPCS | Performed by: SURGERY

## 2018-10-22 PROCEDURE — 82962 GLUCOSE BLOOD TEST: CPT | Mod: 91

## 2018-10-22 PROCEDURE — 83036 HEMOGLOBIN GLYCOSYLATED A1C: CPT

## 2018-10-22 PROCEDURE — 700111 HCHG RX REV CODE 636 W/ 250 OVERRIDE (IP): Performed by: INTERNAL MEDICINE

## 2018-10-22 PROCEDURE — 700102 HCHG RX REV CODE 250 W/ 637 OVERRIDE(OP): Performed by: INTERNAL MEDICINE

## 2018-10-22 PROCEDURE — A9270 NON-COVERED ITEM OR SERVICE: HCPCS | Performed by: INTERNAL MEDICINE

## 2018-10-22 PROCEDURE — 700102 HCHG RX REV CODE 250 W/ 637 OVERRIDE(OP): Performed by: SURGERY

## 2018-10-22 PROCEDURE — 85730 THROMBOPLASTIN TIME PARTIAL: CPT

## 2018-10-22 PROCEDURE — 80048 BASIC METABOLIC PNL TOTAL CA: CPT

## 2018-10-22 PROCEDURE — 770020 HCHG ROOM/CARE - TELE (206)

## 2018-10-22 PROCEDURE — 306015 LOCK STAT FOLEY: Performed by: INTERNAL MEDICINE

## 2018-10-22 PROCEDURE — 85025 COMPLETE CBC W/AUTO DIFF WBC: CPT

## 2018-10-22 PROCEDURE — 700112 HCHG RX REV CODE 229: Performed by: SURGERY

## 2018-10-22 PROCEDURE — 99233 SBSQ HOSP IP/OBS HIGH 50: CPT | Performed by: INTERNAL MEDICINE

## 2018-10-22 PROCEDURE — 700111 HCHG RX REV CODE 636 W/ 250 OVERRIDE (IP): Performed by: SURGERY

## 2018-10-22 PROCEDURE — 36415 COLL VENOUS BLD VENIPUNCTURE: CPT

## 2018-10-22 RX ADMIN — INSULIN HUMAN 3 UNITS: 100 INJECTION, SOLUTION PARENTERAL at 17:29

## 2018-10-22 RX ADMIN — SENNOSIDES AND DOCUSATE SODIUM 2 TABLET: 8.6; 5 TABLET ORAL at 17:19

## 2018-10-22 RX ADMIN — DOCUSATE SODIUM 100 MG: 100 CAPSULE, LIQUID FILLED ORAL at 05:37

## 2018-10-22 RX ADMIN — FINASTERIDE 5 MG: 5 TABLET, FILM COATED ORAL at 05:38

## 2018-10-22 RX ADMIN — DOCUSATE SODIUM 100 MG: 100 CAPSULE, LIQUID FILLED ORAL at 18:00

## 2018-10-22 RX ADMIN — INSULIN HUMAN 3 UNITS: 100 INJECTION, SOLUTION PARENTERAL at 20:38

## 2018-10-22 RX ADMIN — OXYCODONE HYDROCHLORIDE 10 MG: 5 TABLET ORAL at 11:57

## 2018-10-22 RX ADMIN — GLIMEPIRIDE 4 MG: 4 TABLET ORAL at 08:40

## 2018-10-22 RX ADMIN — INSULIN GLARGINE 20 UNITS: 100 INJECTION, SOLUTION SUBCUTANEOUS at 17:28

## 2018-10-22 RX ADMIN — METOPROLOL TARTRATE 50 MG: 50 TABLET ORAL at 05:38

## 2018-10-22 RX ADMIN — ACETAMINOPHEN 325 MG: 325 TABLET, FILM COATED ORAL at 11:57

## 2018-10-22 RX ADMIN — THERA TABS 1 TABLET: TAB at 05:38

## 2018-10-22 RX ADMIN — CEFAZOLIN SODIUM 2 G: 2 INJECTION, SOLUTION INTRAVENOUS at 05:38

## 2018-10-22 RX ADMIN — HEPARIN SODIUM 1450 UNITS/HR: 5000 INJECTION, SOLUTION INTRAVENOUS at 17:19

## 2018-10-22 RX ADMIN — OXYCODONE HYDROCHLORIDE 10 MG: 5 TABLET ORAL at 17:19

## 2018-10-22 RX ADMIN — MORPHINE SULFATE 2 MG: 4 INJECTION INTRAVENOUS at 06:29

## 2018-10-22 RX ADMIN — OMEPRAZOLE 40 MG: 20 CAPSULE, DELAYED RELEASE ORAL at 08:40

## 2018-10-22 RX ADMIN — ONDANSETRON 4 MG: 2 INJECTION INTRAMUSCULAR; INTRAVENOUS at 21:47

## 2018-10-22 RX ADMIN — SENNOSIDES AND DOCUSATE SODIUM 2 TABLET: 8.6; 5 TABLET ORAL at 05:37

## 2018-10-22 RX ADMIN — OXYCODONE HYDROCHLORIDE 10 MG: 5 TABLET ORAL at 21:48

## 2018-10-22 RX ADMIN — DIGOXIN 125 MCG: 125 TABLET ORAL at 17:22

## 2018-10-22 RX ADMIN — METOPROLOL TARTRATE 50 MG: 50 TABLET ORAL at 17:18

## 2018-10-22 RX ADMIN — LISINOPRIL 5 MG: 10 TABLET ORAL at 05:38

## 2018-10-22 RX ADMIN — INSULIN HUMAN 2 UNITS: 100 INJECTION, SOLUTION PARENTERAL at 12:52

## 2018-10-22 RX ADMIN — ACETAMINOPHEN 325 MG: 325 TABLET, FILM COATED ORAL at 17:18

## 2018-10-22 RX ADMIN — OXYCODONE HYDROCHLORIDE 10 MG: 5 TABLET ORAL at 05:38

## 2018-10-22 RX ADMIN — ONDANSETRON 4 MG: 2 INJECTION INTRAMUSCULAR; INTRAVENOUS at 06:44

## 2018-10-22 ASSESSMENT — ENCOUNTER SYMPTOMS
SENSORY CHANGE: 0
FLANK PAIN: 0
CLAUDICATION: 0
VOMITING: 0
DEPRESSION: 0
TINGLING: 1
FOCAL WEAKNESS: 0
WEAKNESS: 1
DIZZINESS: 0
PALPITATIONS: 0
MEMORY LOSS: 0
SPEECH CHANGE: 0
MYALGIAS: 1
SHORTNESS OF BREATH: 0
COUGH: 0
ABDOMINAL PAIN: 0
FEVER: 0
CHILLS: 0
CONSTIPATION: 0
DIARRHEA: 0
HEADACHES: 0
NAUSEA: 0
BACK PAIN: 0
NERVOUS/ANXIOUS: 0
DIAPHORESIS: 0

## 2018-10-22 ASSESSMENT — PAIN SCALES - GENERAL
PAINLEVEL_OUTOF10: 5
PAINLEVEL_OUTOF10: 6
PAINLEVEL_OUTOF10: 5
PAINLEVEL_OUTOF10: 6
PAINLEVEL_OUTOF10: 6
PAINLEVEL_OUTOF10: 7
PAINLEVEL_OUTOF10: 0
PAINLEVEL_OUTOF10: 4
PAINLEVEL_OUTOF10: 0
PAINLEVEL_OUTOF10: 0

## 2018-10-22 ASSESSMENT — PATIENT HEALTH QUESTIONNAIRE - PHQ9
1. LITTLE INTEREST OR PLEASURE IN DOING THINGS: NOT AT ALL
2. FEELING DOWN, DEPRESSED, IRRITABLE, OR HOPELESS: NOT AT ALL
SUM OF ALL RESPONSES TO PHQ9 QUESTIONS 1 AND 2: 0

## 2018-10-22 NOTE — PROGRESS NOTES
AOx3- pt disoriented to event, pt reoriented    Pain rated 4/10 in his right groin- foot elevated for comfort and per order    Pt on 4L nasal cannula- baseline at home for his COPD. Saturating well.   Pt has moderate work of breathing with any movement. Strong, non productive cough present.   IS education initiated.   Lungs sound diminished.     Bowels normoactive x4. + gas. LBM: 10/20  Tolerating a regular diabetic diet.   Diabetes education related to his diet reinforced.   Pt's family member requesting diabetes educator    Remote telemetry leads in place.     Pt reporting some tingling in his feet.     RLE:  Skin is pink. Skin is warm. Pedal pulse is 2+, post tib is 2+  Motor intact    LLE:  Skin is pink. Skin is cool to the touch.   Pedal pulse heard with doppler and spot marked.   Post tib pulse was 1+  Motor intact    Right groin assessed- bruising present. Wound vac in place.     Scattered bruising over both lower extremities- pt thinks its from physical therapy at [facility] in [place] where he previously was.     IV Heparin running and drip rate verified with two RN's at shift change.   Aptt to be redrawn around 0200 tonight  PIV dressing is CDI    Pt was saline locked from his IV fluids per active order      Urinary durant in place still- to be removed POD #2 per order. Stat lock placed on leg.   UOP efficient.     HOB at 30 degrees at all times.   RLE elevated on two pillows    Mepilex placed on sacrum     POC discussed.

## 2018-10-22 NOTE — PROGRESS NOTES
[facility] Hospitalist Progress Note    Date of Service: 10/22/2018    Chief Complaint  78 y.o. male admitted 10/21/2018 with right lower extremity ischemia status post thrombectomy, history of paroxysmal atrial fibrillation on chronic anticoagulation with Coumadin.    Interval Problem Update  Reports right lower extremity tingling sensation, improved strength  Denies any pain    Consultants/Specialty  Vascular surgery    Disposition  TBD line PT/OT evaluation        Review of Systems   Constitutional: Negative for chills, diaphoresis, fever and malaise/fatigue.   HENT: Negative for congestion and hearing loss.    Respiratory: Negative for cough and shortness of breath.    Cardiovascular: Positive for leg swelling. Negative for chest pain, palpitations and claudication.   Gastrointestinal: Negative for abdominal pain, constipation, diarrhea, nausea and vomiting.   Genitourinary: Negative for dysuria and flank pain.   Musculoskeletal: Positive for myalgias. Negative for back pain and joint pain.   Neurological: Positive for tingling and weakness. Negative for dizziness, sensory change, speech change, focal weakness and headaches.   Psychiatric/Behavioral: Negative for depression and memory loss. The patient is not nervous/anxious.       Physical Exam  Laboratory/Imaging   Hemodynamics  Temp (24hrs), Av.7 °C (98 °F), Min:36.2 °C (97.1 °F), Max:37.2 °C (99 °F)   Temperature: 37.1 °C (98.7 °F)  Pulse  Av.4  Min: 66  Max: 100    Blood Pressure : 132/71      Respiratory      Respiration: 18, Pulse Oximetry: 98 %, O2 Daily Delivery Respiratory : Silicone Nasal Cannula     Work Of Breathing / Effort: Mild  RUL Breath Sounds: Clear, RML Breath Sounds: Diminished, RLL Breath Sounds: Diminished, RAFY Breath Sounds: Clear, LLL Breath Sounds: Diminished    Fluids    Intake/Output Summary (Last 24 hours) at 10/22/18 1521  Last data filed at 10/22/18 1150   Gross per 24 hour   Intake             2736 ml   Output              2425 ml   Net              311 ml       Nutrition  Orders Placed This Encounter   Procedures   • Diet Order Regular, Diabetic     Standing Status:   Standing     Number of Occurrences:   1     Order Specific Question:   Diet:     Answer:   Regular [1]     Order Specific Question:   Diet:     Answer:   Diabetic [3]     Order Specific Question:   Calorie modifications:     Answer:   2000 kcals [5]     Physical Exam   Constitutional: He is oriented to person, place, and time. He appears well-nourished. No distress.   HENT:   Head: Normocephalic and atraumatic.   Nose: Nose normal.   Eyes: Pupils are equal, round, and reactive to light. EOM are normal. Right eye exhibits no discharge. Left eye exhibits no discharge.   Neck: Neck supple. No JVD present. No thyromegaly present.   Cardiovascular: Normal rate and intact distal pulses.    No murmur heard.  Pulmonary/Chest: Breath sounds normal. No respiratory distress. He has no wheezes.   Abdominal: Soft. Bowel sounds are normal. He exhibits no distension. There is no tenderness.   Musculoskeletal: He exhibits edema. He exhibits no tenderness.   Lower extremity edema, palpable pulses  Nontender to palpation   Neurological: He is alert and oriented to person, place, and time. No cranial nerve deficit.   Skin: Skin is warm and dry. Rash noted. He is not diaphoretic. No pallor.   Psychiatric: He has a normal mood and affect. His behavior is normal. Judgment and thought content normal.   Nursing note and vitals reviewed.      Recent Labs      10/21/18   0055  10/22/18   0117   WBC  8.5  8.6   RBC  4.45*  4.23*   HEMOGLOBIN  14.5  13.8*   HEMATOCRIT  43.1  42.1   MCV  96.9  99.5*   MCH  32.6  32.6   MCHC  33.6*  32.8*   RDW  45.8  47.5   PLATELETCT  139*  110*   MPV  9.9  10.1     Recent Labs      10/21/18   0055  10/22/18   0117   SODIUM  136  134*   POTASSIUM  4.6  3.9   CHLORIDE  103  102   CO2  24  24   GLUCOSE  269*  119*   BUN  24*  18   CREATININE  1.16  1.07   CALCIUM   9.3  9.1     Recent Labs      10/21/18   0055  10/21/18   0930  10/21/18   1533  10/22/18   0154   APTT  27.7  82.5*  70.5*  54.8*   INR  1.06   --    --    --                   Assessment/Plan     Ischemic leg- (present on admission)   Assessment & Plan    Right ischemic leg  S/p thrombectomy. Completion angiogram showed patent arterial system with PT dominant run-off.  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

## 2018-10-22 NOTE — RESPIRATORY CARE
COPD EDUCATION by COPD CLINICAL EDUCATOR  10/22/2018 at 7:21 AM by Pam Child     Patient reviewed by COPD education team. Patient does not qualify for COPD program.

## 2018-10-22 NOTE — CARE PLAN
Problem: Safety  Goal: Will remain free from injury  Outcome: PROGRESSING AS EXPECTED  Patient educated on the importance of calling a staff member before getting out of bed. Patient verbalizes understanding and patient calling appropriately. Bed in lowest position, call light and other belongings within reach, treaded socks on, and hourly rounding in place. Bed alarm on.     Problem: Pain Management  Goal: Pain level will decrease to patient's comfort goal  Outcome: PROGRESSING AS EXPECTED  Pt educated on the 1-10 pain scale. Pt verbalizes understanding. Pt states that current drug regimen is effectively controlling pain.     Problem: Mobility  Goal: Risk for activity intolerance will decrease  Outcome: PROGRESSING SLOWER THAN EXPECTED  Pt requiring a 2 person assist to ambulate. Only would walk from the bed to the chair and back

## 2018-10-23 LAB
ANION GAP SERPL CALC-SCNC: 8 MMOL/L (ref 0–11.9)
APTT PPP: 57.7 SEC (ref 24.7–36)
BASOPHILS # BLD AUTO: 0.4 % (ref 0–1.8)
BASOPHILS # BLD: 0.03 K/UL (ref 0–0.12)
BUN SERPL-MCNC: 18 MG/DL (ref 8–22)
CALCIUM SERPL-MCNC: 9.1 MG/DL (ref 8.5–10.5)
CHLORIDE SERPL-SCNC: 102 MMOL/L (ref 96–112)
CO2 SERPL-SCNC: 23 MMOL/L (ref 20–33)
CREAT SERPL-MCNC: 1.02 MG/DL (ref 0.5–1.4)
EOSINOPHIL # BLD AUTO: 0.19 K/UL (ref 0–0.51)
EOSINOPHIL NFR BLD: 2.8 % (ref 0–6.9)
ERYTHROCYTE [DISTWIDTH] IN BLOOD BY AUTOMATED COUNT: 46.7 FL (ref 35.9–50)
GLUCOSE BLD-MCNC: 119 MG/DL (ref 65–99)
GLUCOSE BLD-MCNC: 187 MG/DL (ref 65–99)
GLUCOSE BLD-MCNC: 192 MG/DL (ref 65–99)
GLUCOSE BLD-MCNC: 192 MG/DL (ref 65–99)
GLUCOSE BLD-MCNC: 230 MG/DL (ref 65–99)
GLUCOSE SERPL-MCNC: 156 MG/DL (ref 65–99)
HCT VFR BLD AUTO: 40.2 % (ref 42–52)
HGB BLD-MCNC: 13.4 G/DL (ref 14–18)
IMM GRANULOCYTES # BLD AUTO: 0.05 K/UL (ref 0–0.11)
IMM GRANULOCYTES NFR BLD AUTO: 0.7 % (ref 0–0.9)
LYMPHOCYTES # BLD AUTO: 1.7 K/UL (ref 1–4.8)
LYMPHOCYTES NFR BLD: 24.8 % (ref 22–41)
MCH RBC QN AUTO: 32.4 PG (ref 27–33)
MCHC RBC AUTO-ENTMCNC: 33.3 G/DL (ref 33.7–35.3)
MCV RBC AUTO: 97.3 FL (ref 81.4–97.8)
MONOCYTES # BLD AUTO: 0.86 K/UL (ref 0–0.85)
MONOCYTES NFR BLD AUTO: 12.6 % (ref 0–13.4)
NEUTROPHILS # BLD AUTO: 4.02 K/UL (ref 1.82–7.42)
NEUTROPHILS NFR BLD: 58.7 % (ref 44–72)
NRBC # BLD AUTO: 0 K/UL
NRBC BLD-RTO: 0 /100 WBC
PLATELET # BLD AUTO: 120 K/UL (ref 164–446)
PMV BLD AUTO: 10 FL (ref 9–12.9)
POTASSIUM SERPL-SCNC: 4.1 MMOL/L (ref 3.6–5.5)
RBC # BLD AUTO: 4.13 M/UL (ref 4.7–6.1)
SODIUM SERPL-SCNC: 133 MMOL/L (ref 135–145)
WBC # BLD AUTO: 6.9 K/UL (ref 4.8–10.8)

## 2018-10-23 PROCEDURE — 97162 PT EVAL MOD COMPLEX 30 MIN: CPT

## 2018-10-23 PROCEDURE — A9270 NON-COVERED ITEM OR SERVICE: HCPCS | Performed by: SURGERY

## 2018-10-23 PROCEDURE — 85730 THROMBOPLASTIN TIME PARTIAL: CPT

## 2018-10-23 PROCEDURE — A9270 NON-COVERED ITEM OR SERVICE: HCPCS | Performed by: INTERNAL MEDICINE

## 2018-10-23 PROCEDURE — 770020 HCHG ROOM/CARE - TELE (206)

## 2018-10-23 PROCEDURE — 700112 HCHG RX REV CODE 229: Performed by: SURGERY

## 2018-10-23 PROCEDURE — 700111 HCHG RX REV CODE 636 W/ 250 OVERRIDE (IP): Performed by: INTERNAL MEDICINE

## 2018-10-23 PROCEDURE — 80048 BASIC METABOLIC PNL TOTAL CA: CPT

## 2018-10-23 PROCEDURE — 82962 GLUCOSE BLOOD TEST: CPT | Mod: 91

## 2018-10-23 PROCEDURE — 99233 SBSQ HOSP IP/OBS HIGH 50: CPT | Performed by: INTERNAL MEDICINE

## 2018-10-23 PROCEDURE — 700102 HCHG RX REV CODE 250 W/ 637 OVERRIDE(OP): Performed by: SURGERY

## 2018-10-23 PROCEDURE — 700111 HCHG RX REV CODE 636 W/ 250 OVERRIDE (IP): Performed by: SURGERY

## 2018-10-23 PROCEDURE — 97165 OT EVAL LOW COMPLEX 30 MIN: CPT

## 2018-10-23 PROCEDURE — 36415 COLL VENOUS BLD VENIPUNCTURE: CPT

## 2018-10-23 PROCEDURE — G8978 MOBILITY CURRENT STATUS: HCPCS | Mod: CL

## 2018-10-23 PROCEDURE — 700102 HCHG RX REV CODE 250 W/ 637 OVERRIDE(OP): Performed by: INTERNAL MEDICINE

## 2018-10-23 PROCEDURE — G8988 SELF CARE GOAL STATUS: HCPCS | Mod: CI

## 2018-10-23 PROCEDURE — 85025 COMPLETE CBC W/AUTO DIFF WBC: CPT

## 2018-10-23 PROCEDURE — G8979 MOBILITY GOAL STATUS: HCPCS | Mod: CI

## 2018-10-23 PROCEDURE — G8987 SELF CARE CURRENT STATUS: HCPCS | Mod: CK

## 2018-10-23 RX ORDER — OXYCODONE HYDROCHLORIDE 5 MG/1
5 TABLET ORAL
Qty: 24 TAB | Refills: 0 | Status: SHIPPED | OUTPATIENT
Start: 2018-10-23 | End: 2018-10-26

## 2018-10-23 RX ADMIN — OMEPRAZOLE 40 MG: 20 CAPSULE, DELAYED RELEASE ORAL at 06:26

## 2018-10-23 RX ADMIN — OXYCODONE HYDROCHLORIDE 10 MG: 5 TABLET ORAL at 02:00

## 2018-10-23 RX ADMIN — THERA TABS 1 TABLET: TAB at 06:24

## 2018-10-23 RX ADMIN — GLIMEPIRIDE 4 MG: 4 TABLET ORAL at 06:27

## 2018-10-23 RX ADMIN — METOPROLOL TARTRATE 50 MG: 50 TABLET ORAL at 17:51

## 2018-10-23 RX ADMIN — DOCUSATE SODIUM 100 MG: 100 CAPSULE, LIQUID FILLED ORAL at 17:51

## 2018-10-23 RX ADMIN — ACETAMINOPHEN 325 MG: 325 TABLET, FILM COATED ORAL at 12:12

## 2018-10-23 RX ADMIN — DIGOXIN 125 MCG: 125 TABLET ORAL at 17:51

## 2018-10-23 RX ADMIN — INSULIN HUMAN 2 UNITS: 100 INJECTION, SOLUTION PARENTERAL at 13:38

## 2018-10-23 RX ADMIN — OXYCODONE HYDROCHLORIDE 10 MG: 5 TABLET ORAL at 23:22

## 2018-10-23 RX ADMIN — INSULIN GLARGINE 20 UNITS: 100 INJECTION, SOLUTION SUBCUTANEOUS at 17:49

## 2018-10-23 RX ADMIN — HEPARIN SODIUM 1450 UNITS/HR: 5000 INJECTION, SOLUTION INTRAVENOUS at 11:54

## 2018-10-23 RX ADMIN — SENNOSIDES AND DOCUSATE SODIUM 2 TABLET: 8.6; 5 TABLET ORAL at 06:23

## 2018-10-23 RX ADMIN — SENNOSIDES AND DOCUSATE SODIUM 2 TABLET: 8.6; 5 TABLET ORAL at 17:51

## 2018-10-23 RX ADMIN — DOCUSATE SODIUM 100 MG: 100 CAPSULE, LIQUID FILLED ORAL at 06:23

## 2018-10-23 RX ADMIN — OXYCODONE HYDROCHLORIDE 5 MG: 5 TABLET ORAL at 12:12

## 2018-10-23 RX ADMIN — ONDANSETRON 4 MG: 2 INJECTION INTRAMUSCULAR; INTRAVENOUS at 10:51

## 2018-10-23 RX ADMIN — INSULIN HUMAN 2 UNITS: 100 INJECTION, SOLUTION PARENTERAL at 17:50

## 2018-10-23 RX ADMIN — LISINOPRIL 5 MG: 10 TABLET ORAL at 06:22

## 2018-10-23 RX ADMIN — INSULIN HUMAN 3 UNITS: 100 INJECTION, SOLUTION PARENTERAL at 20:51

## 2018-10-23 RX ADMIN — METOPROLOL TARTRATE 50 MG: 50 TABLET ORAL at 06:23

## 2018-10-23 RX ADMIN — FINASTERIDE 5 MG: 5 TABLET, FILM COATED ORAL at 06:23

## 2018-10-23 ASSESSMENT — COGNITIVE AND FUNCTIONAL STATUS - GENERAL
STANDING UP FROM CHAIR USING ARMS: A LITTLE
MOVING FROM LYING ON BACK TO SITTING ON SIDE OF FLAT BED: UNABLE
DRESSING REGULAR LOWER BODY CLOTHING: A LITTLE
TOILETING: A LOT
MOVING TO AND FROM BED TO CHAIR: UNABLE
MOBILITY SCORE: 11
WALKING IN HOSPITAL ROOM: A LITTLE
SUGGESTED CMS G CODE MODIFIER DAILY ACTIVITY: CK
DAILY ACTIVITIY SCORE: 19
HELP NEEDED FOR BATHING: A LITTLE
TURNING FROM BACK TO SIDE WHILE IN FLAT BAD: A LOT
CLIMB 3 TO 5 STEPS WITH RAILING: TOTAL
SUGGESTED CMS G CODE MODIFIER MOBILITY: CL
DRESSING REGULAR UPPER BODY CLOTHING: A LITTLE

## 2018-10-23 ASSESSMENT — PAIN SCALES - GENERAL
PAINLEVEL_OUTOF10: 0
PAINLEVEL_OUTOF10: 2
PAINLEVEL_OUTOF10: 4
PAINLEVEL_OUTOF10: 6

## 2018-10-23 ASSESSMENT — ENCOUNTER SYMPTOMS
PALPITATIONS: 0
BACK PAIN: 0
MEMORY LOSS: 0
FOCAL WEAKNESS: 0
SHORTNESS OF BREATH: 0
CONSTIPATION: 0
CLAUDICATION: 0
NERVOUS/ANXIOUS: 0
DIARRHEA: 0
MYALGIAS: 1
CHILLS: 0
FLANK PAIN: 0
TINGLING: 1
FEVER: 0
HEADACHES: 0
WEAKNESS: 1
DEPRESSION: 0
NAUSEA: 1
VOMITING: 0

## 2018-10-23 ASSESSMENT — GAIT ASSESSMENTS
GAIT LEVEL OF ASSIST: MINIMAL ASSIST
DEVIATION: BRADYKINETIC;ANTALGIC;STEP TO
DISTANCE (FEET): 20
ASSISTIVE DEVICE: FRONT WHEEL WALKER

## 2018-10-23 ASSESSMENT — ACTIVITIES OF DAILY LIVING (ADL): TOILETING: INDEPENDENT

## 2018-10-23 NOTE — THERAPY
"Occupational Therapy Evaluation completed.   Functional Status:    Pt in bed when received by OT. Completed bed mobility with mod A and cuing. Stood from EOB with min A. Pt ambulated to doorway and back, stood to urinate in urinal, mod A required. Sat back down with CGA and cuing.  Plan of Care: Will benefit from Occupational Therapy 4 times per week  Discharge Recommendations:  Equipment: Will Continue to Assess for Equipment Needs. Post-acute therapy Discharge to a transitional care facility for continued skilled therapy services.    See \"Rehab Therapy-Acute\" Patient Summary Report for complete documentation.    Pleasant 79 y/o male admitted to hospital for RLE arterial occlusion, s/p multiple blood clot removals. Pt mobilizing slowly today and presenting with decreased activity tolerance. Required mod A for bed mobility and close contact guard with functional mobility. Will continue working with pt in this setting to improve strength and endurance. Would presently recommend transitional inpatient therapy prior to D/C home.   "

## 2018-10-23 NOTE — PROGRESS NOTES
Awake, no complaints.  AF, VSS     Gen - Pleasant overweight male in NAD.  LE - Dressing intact with mild surrounding ecchymosis.  Palpable bilateral PT pulses with multiphasic Doppler flow signals.  Compartments are soft.     Assessment:  POD #2 right leg thrombectomy and angiogram.     Plan:  Doing well.  Continue Heparin GTT.    May start Eliquis tomorrow, if no evidence of bleeding.  Patient was on Warfarin but his INR was subtherapeutic, leading to arterial embolization to right leg.  PT for mobilization.  Placement.     Discussed with patient and RN.

## 2018-10-23 NOTE — DIETARY
Nutrition Services     Consult received for Diabetic diet education     Provided patient with Diabetic diet education and handouts from the [employer] diet manual and [facility]'s Diabetes Booklet. Pt did not have any questions or concerns about their DM diet at this time. Informed pt on how to contact nutrition services if any questions or concerns arise.   Also provided pt with outpatient nutrition services phone number as well.     Please re-consult nutrition services as warranted.

## 2018-10-23 NOTE — PROGRESS NOTES
AOx4  Pain rated 2/10 in right groin- pt repositioned for comfort.   Pt on 4L nasal cannula (baseline)- saturating well. Work of breathing appears easier than yesterday. Productive cough present per pt. Pt states he has thin sputum.   Lower lung lobes sound slightly diminished. Pt educated on the importance of using his IS. Pt needs some coaching to use appropriately.     Bowels sound normoactive x4. + gas. LBM: 10/20  Pt tolerating a regular/diabetic diet at this time. No nausea. No distention.     TELE leads in place. Pt still in afib    Pt denying any numbness or tingling.     RLE assessed- skin pink and warm. Pedal pulse is 1+, post tib pulse is 1+. Motor intact  LLE assessed- skin pink and warm. Pedal pulse 1+, post tib 1+. Motor intact.     Generalized 1+ pitting edema present over entire body. Swelling lessened since yesterday.     Pt elevating his RLE on two pillows while in bed.     Heparin gtt still going. Two RN verification completed at shift change. Last Aptt therapeutic and no change in rate required. New Aptt time scheduled.     PIV in the left upper arm. Dressing CDI  New PIV on the right arm is saline locked and flushes well.     Scrotal edema 1+. Scrotum in sling for comfort.     Weber removed last night and pt voiding well in urinal    Bed alarm on. Bed in lowest and locked position. Chair alarm on.     Mepilex removed from sacrum. Non pink- used as prophylaxis. Moisturizer applied.     POC discussed.

## 2018-10-23 NOTE — PROGRESS NOTES
Awake, no complaints.  AF, VSS    Gen - Pleasant overweight male in NAD.  LE - Dressing intact with mild surrounding ecchymosis.  Palpable pedal pulses with multiphasic Doppler flow signals.    Assessment:  S/P right leg thrombectomy and angiogram.    Plan:  Doing well.  Continue Heparin GTT.  May start Eliquis on Wednesday, if no evidence of bleeding.  Patient was on Warfarin but his INR was subtherapeutic, leading to arterial embolization to right leg.  PT for mobilization.    Discussed with patient and RN.

## 2018-10-23 NOTE — THERAPY
"Physical Therapy Evaluation completed.   Bed Mobility:  Supine to Sit: Moderate Assist  Transfers: Sit to Stand: Minimal Assist  Gait: Level Of Assist: Minimal Assist (sequencing and balance) with Front-Wheel Walker       Plan of Care: Will benefit from Physical Therapy 4 times per week  Discharge Recommendations: Equipment: Will Continue to Assess for Equipment Needs. Post-acute therapy Discharge to a transitional care facility for continued skilled therapy services prior to DC home.    See \"Rehab Therapy-Acute\" Patient Summary Report for complete documentation.     "

## 2018-10-24 LAB
APTT PPP: 53.4 SEC (ref 24.7–36)
BASE EXCESS BLDA CALC-SCNC: -2 MMOL/L (ref -4–3)
BODY TEMPERATURE: ABNORMAL CENTIGRADE
EKG IMPRESSION: NORMAL
GLUCOSE BLD-MCNC: 158 MG/DL (ref 65–99)
GLUCOSE BLD-MCNC: 164 MG/DL (ref 65–99)
GLUCOSE BLD-MCNC: 198 MG/DL (ref 65–99)
GLUCOSE BLD-MCNC: 215 MG/DL (ref 65–99)
HCO3 BLDA-SCNC: 23 MMOL/L (ref 17–25)
PCO2 BLDA: 36.7 MMHG (ref 26–37)
PH BLDA: 7.41 [PH] (ref 7.4–7.5)
PO2 BLDA: 69.9 MMHG (ref 64–87)
SAO2 % BLDA: 92.7 % (ref 93–99)

## 2018-10-24 PROCEDURE — A9270 NON-COVERED ITEM OR SERVICE: HCPCS | Performed by: SURGERY

## 2018-10-24 PROCEDURE — 99233 SBSQ HOSP IP/OBS HIGH 50: CPT | Performed by: INTERNAL MEDICINE

## 2018-10-24 PROCEDURE — 93005 ELECTROCARDIOGRAM TRACING: CPT | Performed by: INTERNAL MEDICINE

## 2018-10-24 PROCEDURE — 82962 GLUCOSE BLOOD TEST: CPT | Mod: 91

## 2018-10-24 PROCEDURE — 770020 HCHG ROOM/CARE - TELE (206)

## 2018-10-24 PROCEDURE — 85730 THROMBOPLASTIN TIME PARTIAL: CPT

## 2018-10-24 PROCEDURE — 700112 HCHG RX REV CODE 229: Performed by: SURGERY

## 2018-10-24 PROCEDURE — 93010 ELECTROCARDIOGRAM REPORT: CPT | Performed by: INTERNAL MEDICINE

## 2018-10-24 PROCEDURE — 700102 HCHG RX REV CODE 250 W/ 637 OVERRIDE(OP): Performed by: INTERNAL MEDICINE

## 2018-10-24 PROCEDURE — 82803 BLOOD GASES ANY COMBINATION: CPT

## 2018-10-24 PROCEDURE — 700102 HCHG RX REV CODE 250 W/ 637 OVERRIDE(OP): Performed by: SURGERY

## 2018-10-24 PROCEDURE — A9270 NON-COVERED ITEM OR SERVICE: HCPCS | Performed by: INTERNAL MEDICINE

## 2018-10-24 PROCEDURE — 36415 COLL VENOUS BLD VENIPUNCTURE: CPT

## 2018-10-24 RX ORDER — OXYCODONE HYDROCHLORIDE 5 MG/1
2.5 TABLET ORAL EVERY 4 HOURS PRN
Status: DISCONTINUED | OUTPATIENT
Start: 2018-10-24 | End: 2018-10-26 | Stop reason: HOSPADM

## 2018-10-24 RX ORDER — LISINOPRIL 20 MG/1
20 TABLET ORAL
Status: DISCONTINUED | OUTPATIENT
Start: 2018-10-25 | End: 2018-10-26 | Stop reason: HOSPADM

## 2018-10-24 RX ORDER — LISINOPRIL 10 MG/1
10 TABLET ORAL ONCE
Status: COMPLETED | OUTPATIENT
Start: 2018-10-24 | End: 2018-10-24

## 2018-10-24 RX ADMIN — DOCUSATE SODIUM 100 MG: 100 CAPSULE, LIQUID FILLED ORAL at 05:54

## 2018-10-24 RX ADMIN — GLIMEPIRIDE 4 MG: 4 TABLET ORAL at 05:54

## 2018-10-24 RX ADMIN — APIXABAN 5 MG: 5 TABLET, FILM COATED ORAL at 18:13

## 2018-10-24 RX ADMIN — SENNOSIDES AND DOCUSATE SODIUM 2 TABLET: 8.6; 5 TABLET ORAL at 05:54

## 2018-10-24 RX ADMIN — DOCUSATE SODIUM 100 MG: 100 CAPSULE, LIQUID FILLED ORAL at 18:14

## 2018-10-24 RX ADMIN — INSULIN HUMAN 2 UNITS: 100 INJECTION, SOLUTION PARENTERAL at 12:32

## 2018-10-24 RX ADMIN — INSULIN HUMAN 2 UNITS: 100 INJECTION, SOLUTION PARENTERAL at 18:20

## 2018-10-24 RX ADMIN — THERA TABS 1 TABLET: TAB at 05:54

## 2018-10-24 RX ADMIN — FINASTERIDE 5 MG: 5 TABLET, FILM COATED ORAL at 05:54

## 2018-10-24 RX ADMIN — OXYCODONE HYDROCHLORIDE 5 MG: 5 TABLET ORAL at 22:40

## 2018-10-24 RX ADMIN — LISINOPRIL 5 MG: 10 TABLET ORAL at 05:54

## 2018-10-24 RX ADMIN — INSULIN HUMAN 3 UNITS: 100 INJECTION, SOLUTION PARENTERAL at 20:34

## 2018-10-24 RX ADMIN — INSULIN GLARGINE 20 UNITS: 100 INJECTION, SOLUTION SUBCUTANEOUS at 18:17

## 2018-10-24 RX ADMIN — ACETAMINOPHEN 325 MG: 325 TABLET, FILM COATED ORAL at 22:40

## 2018-10-24 RX ADMIN — DIGOXIN 125 MCG: 125 TABLET ORAL at 18:13

## 2018-10-24 RX ADMIN — OMEPRAZOLE 40 MG: 20 CAPSULE, DELAYED RELEASE ORAL at 05:54

## 2018-10-24 RX ADMIN — ACETAMINOPHEN 325 MG: 325 TABLET, FILM COATED ORAL at 07:29

## 2018-10-24 RX ADMIN — INSULIN HUMAN 2 UNITS: 100 INJECTION, SOLUTION PARENTERAL at 08:50

## 2018-10-24 RX ADMIN — LISINOPRIL 10 MG: 10 TABLET ORAL at 12:29

## 2018-10-24 RX ADMIN — METOPROLOL TARTRATE 50 MG: 50 TABLET ORAL at 05:56

## 2018-10-24 RX ADMIN — APIXABAN 5 MG: 5 TABLET, FILM COATED ORAL at 05:54

## 2018-10-24 ASSESSMENT — ENCOUNTER SYMPTOMS
COUGH: 0
DIAPHORESIS: 0
CLAUDICATION: 0
BACK PAIN: 0
TINGLING: 1
CHILLS: 0
DIZZINESS: 0
VOMITING: 0
NERVOUS/ANXIOUS: 0
HEADACHES: 0
FEVER: 0
MYALGIAS: 1
ABDOMINAL PAIN: 0
PALPITATIONS: 0
FLANK PAIN: 0
WEAKNESS: 1
SHORTNESS OF BREATH: 0
NAUSEA: 0
FOCAL WEAKNESS: 0
MEMORY LOSS: 0

## 2018-10-24 ASSESSMENT — PAIN SCALES - GENERAL
PAINLEVEL_OUTOF10: 2
PAINLEVEL_OUTOF10: 6
PAINLEVEL_OUTOF10: 4

## 2018-10-24 ASSESSMENT — CHA2DS2 SCORE
DIABETES: YES
AGE 75 OR GREATER: YES
PRIOR STROKE OR TIA OR THROMBOEMBOLISM: NO
CHA2DS2 VASC SCORE: 5
CHF OR LEFT VENTRICULAR DYSFUNCTION: YES
AGE 65 TO 74: NO
VASCULAR DISEASE: NO
HYPERTENSION: YES
SEX: MALE

## 2018-10-24 NOTE — PROGRESS NOTES
Monitor room called to inform this RN of a 2.4 second pause.   Hospitalist Amy Rodriguez notified right away.   New orders in place for an EKG

## 2018-10-24 NOTE — PROGRESS NOTES
AOx4  Pain rated 0/10 in right groin- pt repositioned for comfort.   Pt on 4L nasal cannula (baseline)- saturating well. Productive cough present per pt. Pt states he has thin sputum.   Lower lung lobes sound slightly diminished.      Bowels sound normoactive x4. + gas. LBM: 10/20  Pt tolerating a regular/diabetic diet at this time. No nausea. No distention.      TELE leads in place. Pt still in afib     Pt denying any numbness or tingling.      RLE assessed- skin pink and warm. Pedal pulse is 1+, post tib pulse is 1+. Motor intact  LLE assessed- skin pink and warm. Pedal pulse 1+, post tib 1+. Motor intact.      Generalized 1+ pitting edema present over entire body. Swelling lessened since yesterday.      Pt elevating his RLE on two pillows while in bed.      Heparin gtt still going. Two RN verification completed at shift change. Last Aptt therapeutic and no change in rate required. New Aptt time scheduled.      PIV in the left upper arm. Dressing CDI  New PIV on the right arm is saline locked and flushes well.      Scrotal edema 1+. Scrotum in sling for comfort.      Weber removed last night and pt voiding well in urinal     Bed alarm on. Bed in lowest and locked position. Chair alarm on.      Mepilex removed from sacrum. Non pink- used as prophylaxis. Moisturizer applied.      POC discussed.

## 2018-10-24 NOTE — DISCHARGE PLANNING
Received Choice form at 1001  Agency/Facility Name: [facility]   Referral sent per Choice form @ 1019

## 2018-10-24 NOTE — DOCUMENTATION QUERY
DOCUMENTATION QUERY    PROVIDERS: Please select “Cosign w/ note”to reply to query.    To better represent the severity of illness of your patient, please review the following information and exercise your independent professional judgment in responding to this query.     COPD with 3 liters 02 is documented in the ED provider notes and consult notes.  . Based upon the clinical findings, risk factors, and treatment, can a diagnosis be provided to support this documentation.        • Chronic Respiratory Failure  • Chronic Hypoxemia  • Other explanation of clinical findings  • Unable to determine      The medical record reflects the following:   Clinical Findings Documented COPD w/3 liters;     Treatment Supplemental 02; respiratory protocol   Risk Factors Age, COPD   Location within medical record H&P; progress notes; ED provider note; consult note     Thank you,  Dayana Mcrae RN, CCDS, CDIP, CCS  Sr. Clinical    ph- [phone]

## 2018-10-24 NOTE — PROGRESS NOTES
AOx4  Pain rated 4/10 in right groin- pt medicated for pain    Pt on 4L nasal cannula (baseline)- saturating well.    Lower lung lobes sound slightly diminished. Pt very drowsy today- Hospitalist notified.     Bowels sound normoactive x4. + gas. LBM: 10/20  Pt tolerating a regular/diabetic diet at this time. No nausea. No distention.      TELE leads in place. Pt still in afib     Pt denying any numbness or tingling.      RLE assessed- skin pink and warm. Pedal pulse is 1+, post tib pulse is 1+. Motor intact  LLE assessed- skin pink and warm. Pedal pulse 1+, post tib 1+. Motor intact.      Generalized 1+ pitting edema present over entire body.      Pt elevating his RLE on two pillows while in bed.      Heparin gtt to be stopped at 0745- Eliquis given two hours prior.  Two RN verification completed at shift change.     PIV in the left upper arm. Dressing CDI  New PIV on the right arm is saline locked and flushes well.      Scrotal edema 1+. Scrotum in sling for comfort.      Bed alarm on. Bed in lowest and locked position. Chair alarm on.      POC discussed.

## 2018-10-24 NOTE — DISCHARGE PLANNING
Agency/Facility Name: [facility]   Spoke To: Devendra   Outcome: Patient has been accepted back to facility. Per Devendra patients bed is available and they are anticipating patients arrival for Friday 10/26. [facility] was asked to try to schedule transport for 1pm and to fax d/c summary with MAR to fax number : [phone].     MARNIE Hayes has been updated.

## 2018-10-24 NOTE — DOCUMENTATION QUERY
DOCUMENTATION QUERY    PROVIDERS: Please select “Cosign w/ note”to reply to query.    To better represent the severity of illness of your patient, please review the following information and exercise your independent professional judgment in responding to this query.     Glucose levels of 269; 119;  156  is noted in the Lab Results . Based upon the clinical findings, risk factors, and treatment, can a diagnosis be provided to support these lab value    · These lab values indicate ______________________  · These lab values have no clinical significance  · Unable to determine    The medical record reflects the following:   Clinical Findings DM2;  Glucose levels of 269;  119;  156   Treatment ISS;  Fingersticks; insulin   Risk Factors Age, DM2; multiple comorbid conditions   Location within medical record Lab Results      Thank you,  Dayana Mcrae RN, CCDS, CDIP, CCS  Sr. Clinical    ph- [phone]

## 2018-10-24 NOTE — CONSULTS
Diabetes education: Order received for diabetes education for family ( per family request). Pt was sleeping at visit and daughter not available. CDE spoke with nursing ( Serenity RN) who states daughter comes in am and afternoon. Asked that she call [phone] when daughter available. CDE will try in AM.  Pt is on Amaryl, Lantus 20 units in pm, and regular insulin sliding scale ac and hs with blood sugars of 227 (3 units), 119, and 187 (2 units). Hg A1c is 9.6%.  Plan: CDE to meet with daughter in am when available. Please call [phone] when daughter available tomorrow.

## 2018-10-24 NOTE — PROGRESS NOTES
Awake, no complaints.  AF, VSS     Gen - Pleasant overweight male in NAD.  LE - Dressing intact with mild surrounding ecchymosis.  Palpable bilateral PT pulses with multiphasic Doppler flow signals.  Compartments are soft.     Assessment:  POD #2 right leg thrombectomy and angiogram.     Plan:  Doing well.  Continue Eliquis indefinitely.  Patient was on Warfarin but his INR was subtherapeutic, leading to arterial embolization to right leg.  PT for mobilization.  Placement.     Discussed with patient and RN.    Will follow peripherally.

## 2018-10-24 NOTE — PROGRESS NOTES
Diabetes education: Attempted to meet with daughter and her request, but she was not available. Please see consult note.  Plan: CDE to meet with daughter in am when available. Please call [phone] when daughter available tomorrow.

## 2018-10-24 NOTE — CARE PLAN
Problem: Communication  Goal: The ability to communicate needs accurately and effectively will improve  Outcome: PROGRESSING AS EXPECTED  Pt communicates needs appropriately at this time.     Problem: Safety  Goal: Will remain free from falls  Outcome: PROGRESSING AS EXPECTED  [facility] fall precautions in place, pt asks for assistance appropriately, up w/2 person assist.

## 2018-10-24 NOTE — PROGRESS NOTES
[facility] Hospitalist Progress Note    Date of Service: 10/24/2018    Chief Complaint  78 y.o. male admitted 10/21/2018 with right lower extremity ischemia status post thrombectomy, history of paroxysmal atrial fibrillation on chronic anticoagulation with Coumadin.    Interval Problem Update   lethargy noted today, easily arousable  Cardiac pauses of 2.4 seconds noted  To new cardiac monitoring  Discontinue beta-blocker  Continue BP control    Consultants/Specialty  Vascular surgery    Disposition  TBD   snf referral, [facility]        Review of Systems   Constitutional: Positive for malaise/fatigue. Negative for chills, diaphoresis and fever.   Respiratory: Negative for cough and shortness of breath.    Cardiovascular: Positive for leg swelling. Negative for palpitations and claudication.   Gastrointestinal: Negative for abdominal pain, nausea and vomiting.   Genitourinary: Negative for dysuria, flank pain and urgency.   Musculoskeletal: Positive for myalgias. Negative for back pain and joint pain.   Neurological: Positive for tingling and weakness. Negative for dizziness, focal weakness and headaches.   Psychiatric/Behavioral: Negative for memory loss. The patient is not nervous/anxious.       Physical Exam  Laboratory/Imaging   Hemodynamics  Temp (24hrs), Av.8 °C (98.2 °F), Min:36.6 °C (97.9 °F), Max:37 °C (98.6 °F)   Temperature: 36.7 °C (98.1 °F)  Pulse  Av.4  Min: 66  Max: 100    Blood Pressure : 125/61      Respiratory      Respiration: 20, Pulse Oximetry: 97 %     Work Of Breathing / Effort: Mild  RUL Breath Sounds: Clear, RML Breath Sounds: Clear, RLL Breath Sounds: Diminished, RAFY Breath Sounds: Clear, LLL Breath Sounds: Diminished    Fluids    Intake/Output Summary (Last 24 hours) at 10/24/18 1224  Last data filed at 10/24/18 0900   Gross per 24 hour   Intake             1180 ml   Output             3325 ml   Net            -2145 ml       Nutrition  Orders Placed This Encounter   Procedures   •  Diet Order Regular, Diabetic     Standing Status:   Standing     Number of Occurrences:   1     Order Specific Question:   Diet:     Answer:   Regular [1]     Order Specific Question:   Diet:     Answer:   Diabetic [3]     Order Specific Question:   Calorie modifications:     Answer:   2000 kcals [5]     Physical Exam   Constitutional: He is oriented to person, place, and time. He appears well-nourished. No distress.   Lethargy   HENT:   Head: Normocephalic and atraumatic.   Nose: Nose normal.   Eyes: Pupils are equal, round, and reactive to light. EOM are normal.   Neck: Normal range of motion. Neck supple. No thyromegaly present.   Cardiovascular: Normal rate and intact distal pulses.    Pulmonary/Chest: Effort normal and breath sounds normal. No respiratory distress. He has no wheezes. He exhibits no tenderness.   Abdominal: Soft. Bowel sounds are normal. He exhibits no distension. There is no tenderness.   Musculoskeletal: He exhibits edema. He exhibits no tenderness.   Lower extremity edema, palpable pulses  Nontender to palpation   Neurological: He is alert and oriented to person, place, and time. No cranial nerve deficit. He exhibits normal muscle tone. Coordination normal.   Skin: Skin is warm and dry. He is not diaphoretic. No erythema.   Psychiatric: He has a normal mood and affect. His behavior is normal. Judgment and thought content normal.   Nursing note and vitals reviewed.      Recent Labs      10/22/18   0117  10/23/18   0255   WBC  8.6  6.9   RBC  4.23*  4.13*   HEMOGLOBIN  13.8*  13.4*   HEMATOCRIT  42.1  40.2*   MCV  99.5*  97.3   MCH  32.6  32.4   MCHC  32.8*  33.3*   RDW  47.5  46.7   PLATELETCT  110*  120*   MPV  10.1  10.0     Recent Labs      10/22/18   0117  10/23/18   0255   SODIUM  134*  133*   POTASSIUM  3.9  4.1   CHLORIDE  102  102   CO2  24  23   GLUCOSE  119*  156*   BUN  18 18   CREATININE  1.07  1.02   CALCIUM  9.1  9.1     Recent Labs      10/22/18   0154  10/23/18   0255   10/24/18   0354   APTT  54.8*  57.7*  53.4*                  Assessment/Plan     * Ischemic leg- (present on admission)   Assessment & Plan    Right ischemic leg  S/p thrombectomy. Completion angiogram showed patent arterial system with PT dominant run-off.  Restarted on Eliquis today  Follow-up CBC in a.m.  Was on coumadin with subtherapeutic inr in the past  rx provided to  to check with insurance coverage  PT/OT - max assist    snf referral    With ongoing lethargy  We will decrease oxycodone to 2.5 mg, hold for sedation    ABG reviewed, within normal limits        Lactic acidosis- (present on admission)   Assessment & Plan    Related to ischemic leg        Obesity- (present on admission)   Assessment & Plan    Diet and exercise education        COPD (chronic obstructive pulmonary disease) (HCC)- (present on admission)   Assessment & Plan    Stable  Chronic respiratory failure, on 4 L home oxygen        Type 2 diabetes mellitus with circulatory disorder (HCC)- (present on admission)   Assessment & Plan    Holding oral medication  On sliding scale insulin        Paroxysmal atrial fibrillation (HCC)- (present on admission)   Assessment & Plan    Rate controlled  Continue metoprolol and digoxin  To restart warfarin after the procedure          Quality-Core Measures   Reviewed items::  Labs reviewed, Medications reviewed and Radiology images reviewed  DVT prophylaxis pharmacological::  Heparin  Ulcer Prophylaxis::  Not indicated  Assessed for rehabilitation services:  Patient was assess for and/or received rehabilitation services during this hospitalization

## 2018-10-24 NOTE — DISCHARGE PLANNING
"Anticipated Discharge Disposition: SNF    Action: This RN CM met with pt at bedside.  Per the pt he wishes to go back to [facility].  Per the pt, \"that facility is real nice and I can work out at the gym right there!\"  Pt signed choice form.  All questions, comments and concerns were addressed.    Barriers to Discharge: Pending acceptance    Plan: Faxed choice form to YOSEPH Knapp.     "

## 2018-10-24 NOTE — CARE PLAN
Problem: Safety  Goal: Will remain free from injury  Outcome: PROGRESSING AS EXPECTED  Patient educated on the importance of calling a staff member before getting out of bed. Patient verbalizes understanding and patient calling appropriately. Bed in lowest position, call light and other belongings within reach, treaded socks on, and hourly rounding in place. Bed alarm on.     Problem: Mobility  Goal: Risk for activity intolerance will decrease  Outcome: PROGRESSING SLOWER THAN EXPECTED  Pt able to get up with a 1-2 assist. Unsteady on his feet. Bed alarm on. PT following.

## 2018-10-24 NOTE — PROGRESS NOTES
APTT came back at 53.4. Pt was given eliquis this AM as ordered. Spoke with pharmacist, Vishnu. It was ordered for heparin gtt to be turned off 2 hrs after eliquis given. Pharmacist says to continue with that order even though the APTT was just below therapeutic levels.

## 2018-10-24 NOTE — DISCHARGE PLANNING
Agency/Facility Name: [facility]   Spoke To: Irena Reception,   Outcome: CCA left message with Irena inquiring about patients referral status. Irena stated that admissions rep [facility] will have to call this CCA back.     CCA awaiting call back from [facility].

## 2018-10-25 LAB
BASOPHILS # BLD AUTO: 0.6 % (ref 0–1.8)
BASOPHILS # BLD: 0.03 K/UL (ref 0–0.12)
EOSINOPHIL # BLD AUTO: 0.27 K/UL (ref 0–0.51)
EOSINOPHIL NFR BLD: 5.4 % (ref 0–6.9)
ERYTHROCYTE [DISTWIDTH] IN BLOOD BY AUTOMATED COUNT: 45.2 FL (ref 35.9–50)
GLUCOSE BLD-MCNC: 158 MG/DL (ref 65–99)
GLUCOSE BLD-MCNC: 199 MG/DL (ref 65–99)
GLUCOSE BLD-MCNC: 213 MG/DL (ref 65–99)
HCT VFR BLD AUTO: 42.5 % (ref 42–52)
HGB BLD-MCNC: 14.4 G/DL (ref 14–18)
IMM GRANULOCYTES # BLD AUTO: 0.04 K/UL (ref 0–0.11)
IMM GRANULOCYTES NFR BLD AUTO: 0.8 % (ref 0–0.9)
LYMPHOCYTES # BLD AUTO: 1.42 K/UL (ref 1–4.8)
LYMPHOCYTES NFR BLD: 28.3 % (ref 22–41)
MCH RBC QN AUTO: 32.7 PG (ref 27–33)
MCHC RBC AUTO-ENTMCNC: 33.9 G/DL (ref 33.7–35.3)
MCV RBC AUTO: 96.4 FL (ref 81.4–97.8)
MONOCYTES # BLD AUTO: 0.63 K/UL (ref 0–0.85)
MONOCYTES NFR BLD AUTO: 12.5 % (ref 0–13.4)
NEUTROPHILS # BLD AUTO: 2.63 K/UL (ref 1.82–7.42)
NEUTROPHILS NFR BLD: 52.4 % (ref 44–72)
NRBC # BLD AUTO: 0 K/UL
NRBC BLD-RTO: 0 /100 WBC
PLATELET # BLD AUTO: 167 K/UL (ref 164–446)
PMV BLD AUTO: 10 FL (ref 9–12.9)
RBC # BLD AUTO: 4.41 M/UL (ref 4.7–6.1)
WBC # BLD AUTO: 5 K/UL (ref 4.8–10.8)

## 2018-10-25 PROCEDURE — A9270 NON-COVERED ITEM OR SERVICE: HCPCS | Performed by: SURGERY

## 2018-10-25 PROCEDURE — 97535 SELF CARE MNGMENT TRAINING: CPT

## 2018-10-25 PROCEDURE — 700111 HCHG RX REV CODE 636 W/ 250 OVERRIDE (IP): Performed by: INTERNAL MEDICINE

## 2018-10-25 PROCEDURE — 99232 SBSQ HOSP IP/OBS MODERATE 35: CPT | Performed by: INTERNAL MEDICINE

## 2018-10-25 PROCEDURE — 97116 GAIT TRAINING THERAPY: CPT

## 2018-10-25 PROCEDURE — 700112 HCHG RX REV CODE 229: Performed by: SURGERY

## 2018-10-25 PROCEDURE — 36415 COLL VENOUS BLD VENIPUNCTURE: CPT

## 2018-10-25 PROCEDURE — 700102 HCHG RX REV CODE 250 W/ 637 OVERRIDE(OP): Performed by: SURGERY

## 2018-10-25 PROCEDURE — A9270 NON-COVERED ITEM OR SERVICE: HCPCS | Performed by: INTERNAL MEDICINE

## 2018-10-25 PROCEDURE — 85025 COMPLETE CBC W/AUTO DIFF WBC: CPT

## 2018-10-25 PROCEDURE — 700102 HCHG RX REV CODE 250 W/ 637 OVERRIDE(OP): Performed by: INTERNAL MEDICINE

## 2018-10-25 PROCEDURE — 97110 THERAPEUTIC EXERCISES: CPT

## 2018-10-25 PROCEDURE — 82962 GLUCOSE BLOOD TEST: CPT | Mod: 91

## 2018-10-25 PROCEDURE — 97530 THERAPEUTIC ACTIVITIES: CPT

## 2018-10-25 PROCEDURE — 770006 HCHG ROOM/CARE - MED/SURG/GYN SEMI*

## 2018-10-25 RX ADMIN — LISINOPRIL 20 MG: 20 TABLET ORAL at 05:24

## 2018-10-25 RX ADMIN — DOCUSATE SODIUM 100 MG: 100 CAPSULE, LIQUID FILLED ORAL at 05:24

## 2018-10-25 RX ADMIN — INSULIN HUMAN 5 UNITS: 100 INJECTION, SOLUTION PARENTERAL at 19:59

## 2018-10-25 RX ADMIN — DIGOXIN 125 MCG: 125 TABLET ORAL at 18:07

## 2018-10-25 RX ADMIN — OXYCODONE HYDROCHLORIDE 5 MG: 5 TABLET ORAL at 05:25

## 2018-10-25 RX ADMIN — OXYCODONE HYDROCHLORIDE 5 MG: 5 TABLET ORAL at 13:00

## 2018-10-25 RX ADMIN — FINASTERIDE 5 MG: 5 TABLET, FILM COATED ORAL at 05:24

## 2018-10-25 RX ADMIN — SENNOSIDES AND DOCUSATE SODIUM 2 TABLET: 8.6; 5 TABLET ORAL at 18:07

## 2018-10-25 RX ADMIN — DOCUSATE SODIUM 100 MG: 100 CAPSULE, LIQUID FILLED ORAL at 18:07

## 2018-10-25 RX ADMIN — SENNOSIDES AND DOCUSATE SODIUM 2 TABLET: 8.6; 5 TABLET ORAL at 05:24

## 2018-10-25 RX ADMIN — OXYCODONE HYDROCHLORIDE 5 MG: 5 TABLET ORAL at 16:36

## 2018-10-25 RX ADMIN — APIXABAN 5 MG: 5 TABLET, FILM COATED ORAL at 05:24

## 2018-10-25 RX ADMIN — GLIMEPIRIDE 4 MG: 4 TABLET ORAL at 07:41

## 2018-10-25 RX ADMIN — MORPHINE SULFATE 4 MG: 4 INJECTION INTRAVENOUS at 18:07

## 2018-10-25 RX ADMIN — INSULIN HUMAN 2 UNITS: 100 INJECTION, SOLUTION PARENTERAL at 07:44

## 2018-10-25 RX ADMIN — INSULIN HUMAN 2 UNITS: 100 INJECTION, SOLUTION PARENTERAL at 11:44

## 2018-10-25 RX ADMIN — OXYCODONE HYDROCHLORIDE 10 MG: 5 TABLET ORAL at 19:55

## 2018-10-25 RX ADMIN — APIXABAN 5 MG: 5 TABLET, FILM COATED ORAL at 18:07

## 2018-10-25 RX ADMIN — THERA TABS 1 TABLET: TAB at 05:24

## 2018-10-25 RX ADMIN — OMEPRAZOLE 40 MG: 20 CAPSULE, DELAYED RELEASE ORAL at 05:24

## 2018-10-25 RX ADMIN — INSULIN GLARGINE 20 UNITS: 100 INJECTION, SOLUTION SUBCUTANEOUS at 18:00

## 2018-10-25 RX ADMIN — INSULIN HUMAN 3 UNITS: 100 INJECTION, SOLUTION PARENTERAL at 17:58

## 2018-10-25 ASSESSMENT — ENCOUNTER SYMPTOMS
NAUSEA: 0
WEAKNESS: 0
CONSTIPATION: 0
DIZZINESS: 0
DIARRHEA: 0
BLURRED VISION: 0
SORE THROAT: 0
ABDOMINAL PAIN: 0
DEPRESSION: 0
CLAUDICATION: 0
MYALGIAS: 0
INSOMNIA: 0
FEVER: 0
HEADACHES: 0
SPEECH CHANGE: 0
NERVOUS/ANXIOUS: 0
PHOTOPHOBIA: 0
SENSORY CHANGE: 0
CHILLS: 0
VOMITING: 0
SHORTNESS OF BREATH: 0
COUGH: 0
HEARTBURN: 0

## 2018-10-25 ASSESSMENT — COGNITIVE AND FUNCTIONAL STATUS - GENERAL
DRESSING REGULAR UPPER BODY CLOTHING: A LITTLE
TURNING FROM BACK TO SIDE WHILE IN FLAT BAD: A LOT
TOILETING: A LITTLE
MOBILITY SCORE: 12
CLIMB 3 TO 5 STEPS WITH RAILING: A LOT
SUGGESTED CMS G CODE MODIFIER DAILY ACTIVITY: CJ
MOVING TO AND FROM BED TO CHAIR: UNABLE
MOVING FROM LYING ON BACK TO SITTING ON SIDE OF FLAT BED: UNABLE
DAILY ACTIVITIY SCORE: 20
HELP NEEDED FOR BATHING: A LITTLE
DRESSING REGULAR LOWER BODY CLOTHING: A LITTLE
STANDING UP FROM CHAIR USING ARMS: A LITTLE
WALKING IN HOSPITAL ROOM: A LITTLE
SUGGESTED CMS G CODE MODIFIER MOBILITY: CL

## 2018-10-25 ASSESSMENT — PAIN SCALES - GENERAL
PAINLEVEL_OUTOF10: 4
PAINLEVEL_OUTOF10: 10
PAINLEVEL_OUTOF10: 0
PAINLEVEL_OUTOF10: 8

## 2018-10-25 ASSESSMENT — GAIT ASSESSMENTS
GAIT LEVEL OF ASSIST: CONTACT GUARD ASSIST
DEVIATION: BRADYKINETIC;ANTALGIC
DISTANCE (FEET): 80
ASSISTIVE DEVICE: FRONT WHEEL WALKER

## 2018-10-25 NOTE — THERAPY
"Physical Therapy Treatment completed.   Bed Mobility:  Supine to Sit: Minimal Assist  Transfers: Sit to Stand: Minimal Assist  Gait: Level Of Assist: Contact Guard Assist with Front-Wheel Walker       Plan of Care: Will benefit from Physical Therapy 4 times per week  Discharge Recommendations: Equipment: Will Continue to Assess for Equipment Needs. Post-acute therapy Discharge to a transitional care facility for continued skilled therapy services prior to DC home.     See \"Rehab Therapy-Acute\" Patient Summary Report for complete documentation.       "

## 2018-10-25 NOTE — THERAPY
"Occupational Therapy Treatment completed with focus on ADLs and ADL transfers.  Functional Status:    Pt seen for OT treatment this afternoon. Demonstrated LB and UB dressing with SBA and sit><stand with CGA. Pt ambulated to bathroom using FWW and transferred onto toilet using grab bar and FWW, with CGA and VCs.   Plan of Care: Will benefit from Occupational Therapy 4 times per week  Discharge Recommendations:  Equipment Will Continue to Assess for Equipment Needs. Post-acute therapy Discharge to a transitional care facility for continued skilled therapy services.    See \"Rehab Therapy-Acute\" Patient Summary Report for complete documentation.     Pt seen for OT treatment this afternoon, was seated at EOB when received by OT. Demonstrated lower body and upper body dressing with SBA, increased time required for socks. Pt stood up with CGA and ambulated to bathroom with FWW, then completed toileting. Pt continues to be limited by decreased endurance and strength, however he has improved with ADL. Acute OT will continue to follow this pt.  "

## 2018-10-25 NOTE — PROGRESS NOTES
Bedside report received.  Assessment complete.  A&O x 4. Patient calls appropriately.  Patient up with 2 assist. Bed alarm in place   Patient has 0/10 pain.   Denies N&V. Tolerating diabetic diet.  Surgical incision with dressing in place to right groin.  + void, + flatus  Patient denies SOB.  SCD's in place.  Patient expected to DC to [place] 10/26 at 1300.  Tele monitoring use.  Review plan with of care with patient. Call light and personal belongings with in reach. Hourly rounding in place. All needs met at this time.

## 2018-10-25 NOTE — PROGRESS NOTES
AOx4  Pain rated 2/10 in right groin- declines pain medication at this time.      Pt on 4L nasal cannula (baseline)- saturating well.     Lower lung lobes sound slightly diminished.      Bowels sound normoactive x4. + gas. LBM: 10/24  Pt tolerating a regular/diabetic diet at this time. No nausea. No distention.      TELE leads in place. Pt still in afib     Pt denying any numbness or tingling.      RLE assessed- skin pink and warm. Pedal pulse is 1+, post tib pulse is 1+. Motor intact  LLE assessed- skin pink and warm. Pedal pulse 1+, post tib 1+. Motor intact.      Generalized 1+ pitting edema present over entire body.      Pt elevating his RLE on two pillows while in bed.      Scrotal edema 1+. Scrotum in sling for comfort.      Bed alarm on. Bed in lowest and locked position. Chair alarm on.      POC discussed.

## 2018-10-25 NOTE — DISCHARGE PLANNING
Agency/Facility Name: [facility]  Outcome: Admissions is not currently there, will call tomorrow in the morning to set up transport.    SREE Cervantes informed.

## 2018-10-25 NOTE — CARE PLAN
Problem: Discharge Barriers/Planning  Goal: Patient's continuum of care needs will be met    Intervention: Involve patient and significant other/support system in setting and prioritizing goals for hospital stay and discharge  Discussed planned DC POC with pt per SW      Problem: Respiratory:  Goal: Respiratory status will improve    Intervention: Educate and encourage incentive spirometry usage  Pt educated on use of IS and recommended frequency

## 2018-10-25 NOTE — CARE PLAN
Problem: Bowel/Gastric:  Goal: Normal bowel function is maintained or improved  Outcome: PROGRESSING AS EXPECTED  Pt had large BM today, audible bowel sounds, passing flatus.     Problem: Respiratory:  Goal: Respiratory status will improve  Outcome: PROGRESSING AS EXPECTED  resp status is at baseline, wears 4L at home, no difficulty breathing at this time.

## 2018-10-25 NOTE — PROGRESS NOTES
Diabetes education: Daughter visited this am and CDE unable to meet with her. CDE spoke with Serenity DYE and obtained name of daughter ( Veronica) . CDE called Veronica this evening to set up time to meet with her tomorrow. Veronica was able to ask questions, so education done via phone.  Discussed need for protein and carbs with every meal, goals for blood sugars ( at 80) and choices for snacks  and fillers.  Veronica states he takes lantus via pen at night but blood sugars had been in the 200 to 300's , with symptoms of hyperglycemia. Discussed options as above as well as possibly adding humalog/novolog pens with sliding scale ac only and starting at 200, to his regime. Questions answered and number given if more questions. Please call [phone] if needs change.

## 2018-10-25 NOTE — DISCHARGE PLANNING
Agency/Facility Name: [facility]  Spoke To: Eli ( [facility])  Outcome: They are ready to receive patient tomorrow, and they also stated that they do not need auth for the type of medication the patient is taking. SREE Cervantes notified.

## 2018-10-25 NOTE — PROGRESS NOTES
"Hospital Medicine Daily Progress Note    Date of Service  10/25/2018    Chief Complaint  78 y.o. male admitted 10/21/2018 with RLE arterial occlusion on Coumadin but INR subtherapeutic on admission.    Hospital Course    Patient underwent thrombectomy of right common femoral, profunda femoral, superificial femoral and popliteal arteries with Dr Rodriguez on 10/21/18 without complication, recommended to be on eliquis for AC this point forward.        Interval Problem Update  Patient states he is feeling well and ready to \"get out of here\" referring to transfer back to SNF in [place] tomorrow.  Tele monitor discontinued.  He will continue with Eliquis for anticoagulation.    Consultants/Specialty  Jennifer - vascular    Code Status  full    Disposition  SNF tomorrow    Review of Systems  Review of Systems   Constitutional: Negative for chills and fever.   HENT: Negative for congestion and sore throat.    Eyes: Negative for blurred vision and photophobia.   Respiratory: Negative for cough and shortness of breath.    Cardiovascular: Negative for chest pain, claudication and leg swelling.   Gastrointestinal: Negative for abdominal pain, constipation, diarrhea, heartburn, nausea and vomiting.   Genitourinary: Negative for dysuria and hematuria.   Musculoskeletal: Negative for joint pain and myalgias.   Skin: Negative for itching and rash.   Neurological: Negative for dizziness, sensory change, speech change, weakness and headaches.   Psychiatric/Behavioral: Negative for depression. The patient is not nervous/anxious and does not have insomnia.         Physical Exam  Temp:  [36 °C (96.8 °F)-36.8 °C (98.2 °F)] 36.8 °C (98.2 °F)  Pulse:  [70-95] 83  Resp:  [16-19] 16  BP: (111-137)/(67-79) 124/79    Physical Exam   Constitutional: He is oriented to person, place, and time. He appears well-developed and well-nourished. No distress.   HENT:   Head: Normocephalic and atraumatic.   Eyes: Conjunctivae are normal. No scleral icterus. "   Neck: Neck supple. No JVD present.   Cardiovascular: Normal rate, regular rhythm and normal heart sounds.  Exam reveals no gallop and no friction rub.    No murmur heard.  Pulmonary/Chest: Effort normal and breath sounds normal. No respiratory distress. He has no wheezes. He exhibits no tenderness.   Abdominal: Soft. Bowel sounds are normal. He exhibits no distension and no mass. There is no tenderness.   Musculoskeletal: He exhibits no edema or tenderness.   Lymphadenopathy:     He has no cervical adenopathy.   Neurological: He is alert and oriented to person, place, and time. No cranial nerve deficit.   Skin: Skin is warm and dry. He is not diaphoretic. No erythema. No pallor.   Psychiatric: He has a normal mood and affect. His behavior is normal.   Nursing note and vitals reviewed.      Fluids    Intake/Output Summary (Last 24 hours) at 10/25/18 1600  Last data filed at 10/25/18 1400   Gross per 24 hour   Intake              600 ml   Output             3625 ml   Net            -3025 ml       Laboratory  Recent Labs      10/23/18   0255  10/25/18   0844   WBC  6.9  5.0   RBC  4.13*  4.41*   HEMOGLOBIN  13.4*  14.4   HEMATOCRIT  40.2*  42.5   MCV  97.3  96.4   MCH  32.4  32.7   MCHC  33.3*  33.9   RDW  46.7  45.2   PLATELETCT  120*  167   MPV  10.0  10.0     Recent Labs      10/23/18   0255   SODIUM  133*   POTASSIUM  4.1   CHLORIDE  102   CO2  23   GLUCOSE  156*   BUN  18   CREATININE  1.02   CALCIUM  9.1     Recent Labs      10/23/18   0255  10/24/18   0354   APTT  57.7*  53.4*               Imaging  DX-PORTABLE FLUOROSCOPY < 1 HOUR   Final Result         1.  Fluoroscopy for bypass and thrombectomy      US-EXTREMITY ARTERY LOWER UNILAT RIGHT   Final Result      DX-KNEE 3 VIEWS RIGHT   Final Result         1.  Possible patellar subluxation, recommend patellar sunrise view for further evaluation.   2.  No fracture appreciated.      DX-CHEST-PORTABLE (1 VIEW)   Final Result         1.  Pulmonary vascular  congestion and mild pulmonary edema   2.  Cardiomegaly      DX-ANKLE 2- VIEWS RIGHT   Final Result         1.  No radiographic evidence of acute traumatic injury.           Assessment/Plan  * Ischemic leg- (present on admission)   Assessment & Plan    Right ischemic leg  Eliquis per vascular sx   Thrombectomy RLE - Dr Rodriguez - POD 3        Obesity- (present on admission)   Assessment & Plan    Diet and exercise education        COPD (chronic obstructive pulmonary disease) ([facility])- (present on admission)   Assessment & Plan    Stable  Chronic respiratory failure, on 4 L home oxygen        Type 2 diabetes mellitus with circulatory disorder ([facility])- (present on admission)   Assessment & Plan    Holding oral medication  On sliding scale insulin        Hypertension- (present on admission)   Assessment & Plan    Lisinopril  Metoprolol discontinued due to cardiac pauses            Paroxysmal atrial fibrillation (HCC)- (present on admission)   Assessment & Plan    Rate controlled  Continue metoprolol and digoxin  Eliquis for AC               VTE prophylaxis: eliquis

## 2018-10-25 NOTE — DISCHARGE PLANNING
Anticipated Discharge Disposition: SNF    Action: Patient's case was discussed today during IDT Rounds.  Per MD, patient will be medically clear tomorrow for transfer to SNF, MD requested confirmation that [facility] can provide the Eliquis, CCA notified via Skype.    Per [facility], [facility] in [place] can accept the patient tomorrow and prior authorization for the Eliquis is not needed.  Per RN, patient is able to transfer via Wheelchair Van, Transportation Form faxed to [facility].      Barriers to Discharge: None.    Plan: SNF Friday October 28, 2018.  Pending transfer arrangements, discharge summary, and transfer arrangements.

## 2018-10-25 NOTE — DISCHARGE PLANNING
I received a PC from Devendra with [facility] who is trying to get a hold of Melina HAMEED or AREN Cervantes. I transferred the call to [facility] and left a  for Yoly for follow up.     Per my conversation of which it was difficult to understand as he was on the road to [place]. He said [facility] will not be providing transport back to [facility].     I called the bedside RN Radha and she is going to page the after hours AREN Ramsay's phone number is [phone]. He is requesting a phone call regarding transportation plan.

## 2018-10-26 VITALS
BODY MASS INDEX: 33.53 KG/M2 | HEIGHT: 72 IN | TEMPERATURE: 97.6 F | HEART RATE: 98 BPM | OXYGEN SATURATION: 95 % | SYSTOLIC BLOOD PRESSURE: 121 MMHG | DIASTOLIC BLOOD PRESSURE: 73 MMHG | WEIGHT: 247.58 LBS | RESPIRATION RATE: 18 BRPM

## 2018-10-26 LAB — GLUCOSE BLD-MCNC: 271 MG/DL (ref 65–99)

## 2018-10-26 PROCEDURE — A9270 NON-COVERED ITEM OR SERVICE: HCPCS | Performed by: INTERNAL MEDICINE

## 2018-10-26 PROCEDURE — 700102 HCHG RX REV CODE 250 W/ 637 OVERRIDE(OP): Performed by: INTERNAL MEDICINE

## 2018-10-26 PROCEDURE — 700102 HCHG RX REV CODE 250 W/ 637 OVERRIDE(OP): Performed by: SURGERY

## 2018-10-26 PROCEDURE — A9270 NON-COVERED ITEM OR SERVICE: HCPCS | Performed by: SURGERY

## 2018-10-26 PROCEDURE — 99239 HOSP IP/OBS DSCHRG MGMT >30: CPT | Performed by: INTERNAL MEDICINE

## 2018-10-26 PROCEDURE — 700112 HCHG RX REV CODE 229: Performed by: SURGERY

## 2018-10-26 RX ORDER — PSEUDOEPHEDRINE HCL 30 MG
100 TABLET ORAL 2 TIMES DAILY
Qty: 60 CAP
Start: 2018-10-26

## 2018-10-26 RX ORDER — LISINOPRIL 20 MG/1
20 TABLET ORAL DAILY
Qty: 30 TAB | Status: ON HOLD
Start: 2018-10-27 | End: 2019-11-10

## 2018-10-26 RX ADMIN — OXYCODONE HYDROCHLORIDE 10 MG: 5 TABLET ORAL at 05:34

## 2018-10-26 RX ADMIN — FINASTERIDE 5 MG: 5 TABLET, FILM COATED ORAL at 05:34

## 2018-10-26 RX ADMIN — DOCUSATE SODIUM 100 MG: 100 CAPSULE, LIQUID FILLED ORAL at 05:34

## 2018-10-26 RX ADMIN — SENNOSIDES AND DOCUSATE SODIUM 2 TABLET: 8.6; 5 TABLET ORAL at 05:34

## 2018-10-26 RX ADMIN — APIXABAN 5 MG: 5 TABLET, FILM COATED ORAL at 05:34

## 2018-10-26 RX ADMIN — INSULIN HUMAN 2 UNITS: 100 INJECTION, SOLUTION PARENTERAL at 08:50

## 2018-10-26 RX ADMIN — LISINOPRIL 20 MG: 20 TABLET ORAL at 05:34

## 2018-10-26 RX ADMIN — OXYCODONE HYDROCHLORIDE 10 MG: 5 TABLET ORAL at 00:49

## 2018-10-26 RX ADMIN — GLIMEPIRIDE 4 MG: 4 TABLET ORAL at 08:00

## 2018-10-26 RX ADMIN — THERA TABS 1 TABLET: TAB at 05:34

## 2018-10-26 RX ADMIN — OMEPRAZOLE 40 MG: 20 CAPSULE, DELAYED RELEASE ORAL at 05:34

## 2018-10-26 ASSESSMENT — PATIENT HEALTH QUESTIONNAIRE - PHQ9
SUM OF ALL RESPONSES TO PHQ9 QUESTIONS 1 AND 2: 0
2. FEELING DOWN, DEPRESSED, IRRITABLE, OR HOPELESS: NOT AT ALL
1. LITTLE INTEREST OR PLEASURE IN DOING THINGS: NOT AT ALL

## 2018-10-26 ASSESSMENT — PAIN SCALES - GENERAL: PAINLEVEL_OUTOF10: 7

## 2018-10-26 NOTE — DISCHARGE SUMMARY
Discharge Summary    CHIEF COMPLAINT ON ADMISSION  Chief Complaint   Patient presents with   • Cold Extremity     RLE arterial occlusion.       Reason for Admission  Transfer     CODE STATUS  Full Code    HPI & HOSPITAL COURSE  This is a 78 y.o. male here with cold right leg at [facility] in [place].     Patient underwent thrombectomy of right common femoral, profunda femoral, superificial femoral and popliteal arteries with Dr Rodriguez on 10/21/18 without complication, recommended to be on eliquis for AC this point forward.    OK to shower but Paint right groin incision with Betadine and cover with dry gauze once a day to prevent maceration and breakdown of wound due to moisture in groin and pannus.    Therefore, he is discharged in good and stable condition to skilled nursing facility.    The patient met 2-midnight criteria for an inpatient stay at the time of discharge.      FOLLOW UP ITEMS POST DISCHARGE  Dr Rodriguez in 2 weeks for post intervention follow up.  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 *      FOLLOW UP  No future appointments.  [facility] ([facility])  [address] [address]  [phone]          MEDICATIONS ON DISCHARGE     Medication List      START taking these medications      Instructions   apixaban 5mg Tabs  Commonly known as:  ELIQUIS   Take 1 Tab by mouth 2 Times a Day.  Dose:  5 mg     docusate sodium 100 MG Caps   Take 100 mg by mouth 2 Times a Day.  Dose:  100 mg     insulin regular 100 Unit/mL Soln  Commonly known as:  HUMULIN R   Inject 2-9 Units as instructed  4 Times a Day,Before Meals and at Bedtime.  Dose:  2-9 Units     oxyCODONE immediate-release 5 MG Tabs  Commonly known as:  ROXICODONE   Take 1 Tab by mouth every 3 hours as needed for up to 3 days.  Dose:  5 mg        CHANGE how you take these medications      Instructions   lisinopril 20 MG Tabs  What changed:  · medication strength  · how much to take  · when to take this  Commonly known as:  PRINIVIL   Take 1 Tab by mouth every day.  Dose:  20 mg        CONTINUE taking these medications      Instructions   albuterol 2.5mg/0.5ml Nebu  Commonly known as:  PROVENTIL   2.5 mg by Nebulization route every four hours as needed for Shortness of Breath.  Dose:  2.5 mg     digoxin 125 MCG Tabs  Commonly known as:  LANOXIN   Take 125 mcg by mouth every morning.  Dose:  125 mcg     finasteride 5 MG Tabs  Commonly known as:  PROSCAR   Take 5 mg by mouth every morning.  Dose:  5 mg     glimepiride 4 MG Tabs  Commonly known as:  AMARYL   Take 4 mg by mouth 2 times a day.  Dose:  4 mg     LANTUS SOLOSTAR 100 UNIT/ML Sopn injection  Generic drug:  insulin glargine   Inject 20 Units as instructed every evening.  Dose:  20 Units     multivitamin Tabs   Take 1 Tab by mouth every morning.  Dose:  1 Tab     omeprazole 20 MG delayed-release capsule  Commonly known as:  PRILOSEC   Take 40 mg by mouth every morning before breakfast.  Dose:  40 mg     raNITidine 150 MG Tabs  Commonly known as:  ZANTAC   Take 150 mg by mouth 2 times a day.  Dose:  150 mg     tramadol 50 MG Tabs  Commonly known as:  ULTRAM   Take  mg by mouth 3 times a day as needed (for pain).  Dose:   mg     traZODone 50 MG Tabs  Commonly known as:  DESYREL   Take 50 mg by mouth at bedtime as needed for Sleep.  Dose:  50 mg        STOP taking these medications    metoprolol 50 MG Tabs  Commonly known as:  LOPRESSOR     warfarin 2 MG Tabs  Commonly known as:  COUMADIN            Allergies  Allergies   Allergen Reactions   • Gabapentin        DIET  Orders  Placed This Encounter   Procedures   • Diet Order Regular, Diabetic     Standing Status:   Standing     Number of Occurrences:   1     Order Specific Question:   Diet:     Answer:   Regular [1]     Order Specific Question:   Diet:     Answer:   Diabetic [3]     Order Specific Question:   Calorie modifications:     Answer:   2000 kcals [5]       ACTIVITY  As tolerated.  Weight bearing as tolerated    LINES, DRAINS, AND WOUNDS  This is an automated list. Peripheral IVs will be removed prior to discharge.  Peripheral IV 10/23/18 20 G Right Forearm (Active)   Site Assessment Clean;Dry;Intact 10/25/2018  8:00 PM   Dressing Type Transparent 10/25/2018  8:00 PM   Line Status Saline locked;Flushed 10/25/2018  8:00 PM   Dressing Status Clean;Dry;Intact 10/25/2018  8:00 PM   Dressing Intervention N/A 10/25/2018  7:40 AM   Infiltration Grading ([facility]) 0 10/25/2018  8:00 PM   Phlebitis Scale ([facility] Only) 0 10/25/2018  8:00 PM       Peripheral IV 10/22/18 20 G Left Upper arm (Active)   Site Assessment Clean;Dry;Intact;Pink 10/25/2018  8:00 PM   Dressing Type Transparent 10/25/2018  8:00 PM   Line Status Saline locked 10/25/2018  8:00 PM   Dressing Status Clean;Dry;Intact 10/25/2018  8:00 PM   Dressing Intervention N/A 10/25/2018  7:40 AM   Date Primary Tubing Changed 10/23/18 10/25/2018  7:40 AM   NEXT Primary Tubing Change  10/27/18 10/25/2018  7:40 AM   Infiltration Grading ([facility]) 0 10/25/2018  8:00 PM   Phlebitis Scale ([facility] Only) 0 10/25/2018  8:00 PM          Peripheral IV 10/23/18 20 G Right Forearm (Active)   Site Assessment Clean;Dry;Intact 10/25/2018  8:00 PM   Dressing Type Transparent 10/25/2018  8:00 PM   Line Status Saline locked;Flushed 10/25/2018  8:00 PM   Dressing Status Clean;Dry;Intact 10/25/2018  8:00 PM   Dressing Intervention N/A 10/25/2018  7:40 AM   Infiltration Grading ([facility]) 0 10/25/2018  8:00 PM   Phlebitis Scale ([facility] Only) 0 10/25/2018  8:00 PM       Peripheral IV 10/22/18  20 G Left Upper arm (Active)   Site Assessment Clean;Dry;Intact;Pink 10/25/2018  8:00 PM   Dressing Type Transparent 10/25/2018  8:00 PM   Line Status Saline locked 10/25/2018  8:00 PM   Dressing Status Clean;Dry;Intact 10/25/2018  8:00 PM   Dressing Intervention N/A 10/25/2018  7:40 AM   Date Primary Tubing Changed 10/23/18 10/25/2018  7:40 AM   NEXT Primary Tubing Change  10/27/18 10/25/2018  7:40 AM   Infiltration Grading ([facility]) 0 10/25/2018  8:00 PM   Phlebitis Scale ([facility] Only) 0 10/25/2018  8:00 PM               MENTAL STATUS ON TRANSFER  Level of Consciousness: Alert  Orientation : Oriented x 4  Speech: Speech Clear (mumbles)    CONSULTATIONS  Dr Rodriguez - vascular surgery    PROCEDURES  Dr Rodriguez 10/21/18:  1.  Right common femoral, profunda femoral, superficial femoral, and popliteal   artery thrombectomy.  2.  Needle, right leg.  3.  Right leg angiogram    LABORATORY  Lab Results   Component Value Date    SODIUM 133 (L) 10/23/2018    POTASSIUM 4.1 10/23/2018    CHLORIDE 102 10/23/2018    CO2 23 10/23/2018    GLUCOSE 156 (H) 10/23/2018    BUN 18 10/23/2018    CREATININE 1.02 10/23/2018        Lab Results   Component Value Date    WBC 5.0 10/25/2018    HEMOGLOBIN 14.4 10/25/2018    HEMATOCRIT 42.5 10/25/2018    PLATELETCT 167 10/25/2018        Total time of the discharge process exceeds 35 minutes.

## 2018-10-26 NOTE — DISCHARGE PLANNING
Agency/Facility Name: [facility]  Spoke To: Tad  Outcome: Does not have any transport to [place] until Monday.    Family authorized by MD to transport patient @ 1000.  Discharge summary and last 72 hours of clinical notes were faxed to [facility].    SERE Cervantes informed.

## 2018-10-26 NOTE — PROGRESS NOTES
"Report received from day shift RN, assumed Care.   Patient is AOx4, responds appropriately.      Pt denies any pain at this time; however, he is requesting pain medication to \"stay on top of it\". Patient medicated per MAR.   Patient is tolerating diabetic diet, denies nausea/vomiting. + flatus  Up stand by assist with FWW. Voiding regularly up to bathroom.   Surgical incision to right groin well approximated, dry gauze dressing changed.     Plan of care discussed, all questions answered.    Educated on use of call light and importance of calling before getting out of bed. Pt verbalizes understanding.    Call light and belongings within reach, treaded slipper socks on, bed alarm in use, SCDs in use, bed in lowest locked position. Family at bedside. Hourly rounding in place, all needs met at this time.  "

## 2018-10-26 NOTE — CARE PLAN
Problem: Safety  Goal: Will remain free from injury  Outcome: PROGRESSING AS EXPECTED  Safety precautions in place. Bed in locked/low position. 2 side rails up. Treaded socks. BA on. Call light in reach, calls appropriately. Hourly rounding practiced.    Problem: Pain Management  Goal: Pain level will decrease to patient's comfort goal  Outcome: PROGRESSING AS EXPECTED  Pain managed with oxycodone prn

## 2018-10-26 NOTE — DISCHARGE PLANNING
Anticipated Discharge Disposition: SNF    Action: MD confirmed medical clearance and provided the discharge summary.  [facility] reported [facility] transportation is not available today.  MD provided the authorization for patient to be transfer by family via private car.  Per RN, patient's family brought in the oxygen tanks for transportation from [place] to [place], [facility] notified via [employer].    Per [facility], [facility] confirmed acceptance and bed availability, accepting is Dr. Blackmon.  [facility] consulted with Anais,  Supervisor regarding the transfer packet.      Per Anais, providing the discharge summary and Cobra to patient is appropriate.  Anais recommended faxing the last 72 hours of clinical to [facility] notified via [employer].    Barriers to Discharge: None.    Plan: SNF via private car, per authorization from MD.

## 2018-10-26 NOTE — DISCHARGE INSTRUCTIONS
Transfer to SNF  OK to shower but Paint right groin incision with Betadine and cover with dry gauze once a day to prevent maceration and breakdown of wound due to moisture in groin and pannus.  F/u with Dr Rodriguez in 2 weeks  Family can transport patient with oxygen to SNF in [place].  Discharge Instructions    Discharged to other by car with relative. Discharged via wheelchair, hospital escort: Yes.  Special equipment needed: Oxygen and Walker    Be sure to schedule a follow-up appointment with your primary care doctor or any specialists as instructed.     Discharge Plan:   Pneumococcal Vaccine Administered/Refused: Not given - Patient refused pneumococcal vaccine  Influenza Vaccine Indication: Patient Refuses    I understand that a diet low in cholesterol, fat, and sodium is recommended for good health. Unless I have been given specific instructions below for another diet, I accept this instruction as my diet prescription.   Other diet: Diabetic    Special Instructions: None    · Is patient discharged on Warfarin / Coumadin?   No     Depression / Suicide Risk    As you are discharged from this [facility] facility, it is important to learn how to keep safe from harming yourself.    Recognize the warning signs:  · Abrupt changes in personality, positive or negative- including increase in energy   · Giving away possessions  · Change in eating patterns- significant weight changes-  positive or negative  · Change in sleeping patterns- unable to sleep or sleeping all the time   · Unwillingness or inability to communicate  · Depression  · Unusual sadness, discouragement and loneliness  · Talk of wanting to die  · Neglect of personal appearance   · Rebelliousness- reckless behavior  · Withdrawal from people/activities they love  · Confusion- inability to concentrate     If you or a loved one observes any of these behaviors or has concerns about self-harm, here's what you can do:  · Talk about it- your feelings and reasons  for harming yourself  · Remove any means that you might use to hurt yourself (examples: pills, rope, extension cords, firearm)  · Get professional help from the community (Mental Health, Substance Abuse, psychological counseling)  · Do not be alone:Call your Safe Contact- someone whom you trust who will be there for you.  · Call your local CRISIS HOTLINE 143-9957 or 819-641-1286  · Call your local Children's Mobile Crisis Response Team [place] (820) 452-1911 or www.[employer]  · Call the toll free National Suicide Prevention Hotlines   · National Suicide Prevention Lifeline 947-377-SYRH (0752)  · National Hope Line Network 800-SUICIDE (765-4661)

## 2018-10-26 NOTE — DISCHARGE PLANNING
Agency/Facility Name: [facility]  Spoke To: Shell  Outcome: Admissions is not in yet, left message about setting up transport, awaiting call back.

## 2018-10-26 NOTE — PROGRESS NOTES
Awake, no complaints.  AF, VSS     Gen - Pleasant overweight male in NAD.  LE - Right groin incision healing well with mild resolving surrounding ecchymosis but without erythema, drainage, or fluctuation.  Palpable bilateral PT pulses with multiphasic Doppler flow signals.  Compartments are soft.     Assessment:  POD #3 right leg thrombectomy and angiogram.     Plan:  Doing well.  Continue Eliquis indefinitely.  Patient was on Warfarin but his INR was subtherapeutic, leading to arterial embolization to right leg.  OK to shower but Paint right groin incision with Betadine and cover with dry gauze once a day to prevent maceration and breakdown of wound due to moisture in groin and pannus.  Placement.     Discussed with patient and RN.     Will sign off.  Please have patient follow up with me in 2 weeks after discharged.    Thanks!

## 2018-10-26 NOTE — PROGRESS NOTES
Paged on call SW awaiting call back. MTM called, after business hours. unable to arrange ride. Family can transport pt once home O2 is delivered if needs to.

## 2018-10-27 NOTE — PROGRESS NOTES
Assume care of patient at 0730.  AOx4, daughter at bedside.  Denies pain issues at this time.  Daughter and patient anxious to be discharged in a timely manner as he is expected at SNF in [place] later today.  Per patient we are waiting on social work and MD to authorize family to transport patient via POV to SNF.  Daughter has secured home 02 for transport.  Order received and paperwork provided by Minna for discharge. Report called to SNF, discharge instructions and paper scripts given to patient, 2 daughters and son-in-law. PT discharged to SNF, brought to POV via w/c with staff attendant.  IV discontinued, site wdl.

## 2018-12-17 ENCOUNTER — OFFICE VISIT (OUTPATIENT)
Dept: MEDICAL GROUP | Facility: MEDICAL CENTER | Age: 78
End: 2018-12-17
Payer: MEDICARE

## 2018-12-17 VITALS
RESPIRATION RATE: 16 BRPM | BODY MASS INDEX: 37.18 KG/M2 | OXYGEN SATURATION: 95 % | DIASTOLIC BLOOD PRESSURE: 78 MMHG | HEIGHT: 69 IN | HEART RATE: 78 BPM | WEIGHT: 251 LBS | TEMPERATURE: 98.7 F | SYSTOLIC BLOOD PRESSURE: 132 MMHG

## 2018-12-17 DIAGNOSIS — J42 CHRONIC BRONCHITIS, UNSPECIFIED CHRONIC BRONCHITIS TYPE (HCC): ICD-10-CM

## 2018-12-17 DIAGNOSIS — Z79.4 TYPE 2 DIABETES MELLITUS WITH OTHER CIRCULATORY COMPLICATION, WITH LONG-TERM CURRENT USE OF INSULIN (HCC): ICD-10-CM

## 2018-12-17 DIAGNOSIS — N40.1 BENIGN PROSTATIC HYPERPLASIA WITH URINARY HESITANCY: ICD-10-CM

## 2018-12-17 DIAGNOSIS — I48.0 PAROXYSMAL ATRIAL FIBRILLATION (HCC): ICD-10-CM

## 2018-12-17 DIAGNOSIS — I99.8 ISCHEMIC LEG: ICD-10-CM

## 2018-12-17 DIAGNOSIS — E11.59 TYPE 2 DIABETES MELLITUS WITH OTHER CIRCULATORY COMPLICATION, WITH LONG-TERM CURRENT USE OF INSULIN (HCC): ICD-10-CM

## 2018-12-17 DIAGNOSIS — I10 ESSENTIAL HYPERTENSION: ICD-10-CM

## 2018-12-17 DIAGNOSIS — I25.10 CORONARY ARTERY DISEASE INVOLVING NATIVE HEART WITHOUT ANGINA PECTORIS, UNSPECIFIED VESSEL OR LESION TYPE: ICD-10-CM

## 2018-12-17 DIAGNOSIS — R39.11 BENIGN PROSTATIC HYPERPLASIA WITH URINARY HESITANCY: ICD-10-CM

## 2018-12-17 PROBLEM — E66.9 OBESITY: Status: RESOLVED | Noted: 2018-10-21 | Resolved: 2018-12-17

## 2018-12-17 PROBLEM — E87.20 LACTIC ACIDOSIS: Status: RESOLVED | Noted: 2018-10-21 | Resolved: 2018-12-17

## 2018-12-17 PROCEDURE — 99203 OFFICE O/P NEW LOW 30 MIN: CPT | Performed by: NURSE PRACTITIONER

## 2018-12-17 RX ORDER — TAMSULOSIN HYDROCHLORIDE 0.4 MG/1
0.4 CAPSULE ORAL
COMMUNITY

## 2018-12-17 RX ORDER — CITALOPRAM 20 MG/1
20 TABLET ORAL DAILY
COMMUNITY

## 2018-12-17 RX ORDER — FUROSEMIDE 40 MG/1
40 TABLET ORAL DAILY
COMMUNITY

## 2018-12-17 RX ORDER — ATORVASTATIN CALCIUM 40 MG/1
40 TABLET, FILM COATED ORAL NIGHTLY
COMMUNITY

## 2018-12-17 NOTE — PROGRESS NOTES
Subjective:      Neil De Souza is a 78 y.o. male who presents with Establish Care        CC: Patient here today accompanied by his daughter/caregiver to establish care with history of diabetes, atrial fibrillation, COPD and ischemic leg.  His previous PCP was in [place] where patient lives.    HPI Neil De Souza      1. Ischemic leg  Patient admitted to the hospital October 21.  He had been in a SNF and had progressively worsening leg pain and coldness of the leg.  He was found to have blockage and underwent a thrombectomy of the right common femoral, profunda femoral, superficial femoral, and popliteal arteries with .  Patient responded well post surgery and states today that he is walking much better and has no pain.  He continues on Eliquis.    2. Type 2 diabetes mellitus with other circulatory complication, with long-term current use of insulin ([facility])  Patient apparently was on Actos and Amaryl prior to surgery but continued to have high blood sugars with his last hemoglobin A1c at 9.6 in October.  He is now on a combination of Amaryl and Lantus insulin.  He also has 4 times a day sliding scale short acting insulin.  His blood sugars are still reported to be in the 300 range nonfasting.  His daughter states part of the problem is that he refuses to give up drinking sodas and drinks about a gallon of milk per day.  She has been attempting to have him switch his diet.    3. Paroxysmal atrial fibrillation ([facility])  Patient on Eliquis and digoxin.  His daughter would like him to establish with local cardiology for follow-up.    4. Essential hypertension  Blood pressure controlled on his combination of lisinopril and Lasix.    5. Chronic bronchitis, unspecified chronic bronchitis type (HCC)  Patient was a long-term tobacco user and subsequently is now on oxygen at 2 L 24 hours/day.  He has albuterol to use if needed.    6. Coronary artery disease involving native heart without angina pectoris, unspecified vessel  or lesion type  Patient on Lipitor 40 mg now as well as Eliquis.    7. Benign prostatic hyperplasia with urinary hesitancy  Patient on Flomax and Proscar which he feels is helpful.  Social History   Substance Use Topics   • Smoking status: Former Smoker     Types: Pipe, Cigars   • Smokeless tobacco: Former User   • Alcohol use No      Comment: hx alcohol abuse     Family History   Problem Relation Age of Onset   • Hypertension Mother    • No Known Problems Father      Current Outpatient Prescriptions   Medication Sig Dispense Refill   • furosemide (LASIX) 40 MG Tab Take 40 mg by mouth every day.     • citalopram (CELEXA) 20 MG Tab Take 20 mg by mouth every day.     • tamsulosin (FLOMAX) 0.4 MG capsule Take 0.4 mg by mouth ONE-HALF HOUR AFTER BREAKFAST.     • atorvastatin (LIPITOR) 40 MG Tab Take 40 mg by mouth every evening.     • Metoprolol Succinate 25 MG Capsule ER 24 Hour Sprinkle Take  by mouth.     • apixaban (ELIQUIS) 5mg Tab Take 1 Tab by mouth 2 Times a Day. 60 Tab    • lisinopril (PRINIVIL) 20 MG Tab Take 1 Tab by mouth every day. 30 Tab    • docusate sodium 100 MG Cap Take 100 mg by mouth 2 Times a Day. 60 Cap    • insulin regular (HUMULIN R) 100 Unit/mL Solution Inject 2-9 Units as instructed 4 Times a Day,Before Meals and at Bedtime. 10 mL    • digoxin (LANOXIN) 125 MCG Tab Take 125 mcg by mouth every morning.     • finasteride (PROSCAR) 5 MG Tab Take 5 mg by mouth every morning.     • glimepiride (AMARYL) 4 MG Tab Take 4 mg by mouth 2 times a day.     • insulin glargine (LANTUS SOLOSTAR) 100 UNIT/ML Solution Pen-injector injection Inject 20 Units as instructed every evening.     • multivitamin (THERAGRAN) Tab Take 1 Tab by mouth every morning.     • omeprazole (PRILOSEC) 20 MG delayed-release capsule Take 40 mg by mouth every morning before breakfast.     • raNITidine (ZANTAC) 150 MG Tab Take 150 mg by mouth 2 times a day.     • traZODone (DESYREL) 50 MG Tab Take 50 mg by mouth at bedtime as needed for  "Sleep.     • albuterol (PROVENTIL) 2.5mg/0.5ml Nebu Soln 2.5 mg by Nebulization route every four hours as needed for Shortness of Breath.       No current facility-administered medications for this visit.      Past Medical History:   Diagnosis Date   • Chronic obstructive pulmonary disease (HCC)    • Congestive heart failure (HCC)    • Diabetes (HCC)        Review of Systems   Genitourinary: Positive for frequency.   All other systems reviewed and are negative.         Objective:     /78 (BP Location: Left arm)   Pulse 78   Temp 37.1 °C (98.7 °F)   Resp 16   Ht 1.753 m (5' 9\")   Wt 113.9 kg (251 lb)   SpO2 95% Comment: 2ltrs  BMI 37.07 kg/m²      Physical Exam   Constitutional: He is oriented to person, place, and time. He appears well-developed and well-nourished. No distress.   HENT:   Head: Normocephalic and atraumatic.   Right Ear: External ear normal.   Left Ear: External ear normal.   Nose: Nose normal.   Mouth/Throat: Oropharynx is clear and moist.   Eyes: Conjunctivae are normal. Right eye exhibits no discharge. Left eye exhibits no discharge.   Neck: Normal range of motion. Neck supple. No tracheal deviation present. No thyromegaly present.   Cardiovascular: Normal rate and regular rhythm.    Murmur heard.  Legs feel warm to touch bilaterally and there is minimal bilateral lower extremity edema.   Pulmonary/Chest: Effort normal. No respiratory distress. He has decreased breath sounds. He has no wheezes. He has no rales.   Lymphadenopathy:     He has no cervical adenopathy.   Neurological: He is alert and oriented to person, place, and time. Coordination normal.   Skin: Skin is warm and dry. No rash noted. He is not diaphoretic. No erythema.   Psychiatric: He has a normal mood and affect. His behavior is normal. Judgment and thought content normal.   Nursing note and vitals reviewed.              Assessment/Plan:     1. Ischemic leg  Patient underwent successful thrombectomy and he reports no " further pain which interfered with his life prior to surgery.  He continues on anticoagulation and statins.  He follows with his vascular surgeon.    2. Type 2 diabetes mellitus with other circulatory complication, with long-term current use of insulin ([facility])  Blood sugars currently not well controlled according to his home blood sugar readings.  I advised his daughter increasing the Lantus insulin by 3 units every 3 days to get his fasting blood sugars below 200 with a goal of a hemoglobin A1c of 7.5 for his age.  I spoke with him about the need for stopping his sugary sodas and eating more fruits and vegetables and protein and decreasing his simple carbohydrates.  I will have him follow-up with myself and the diabetic nurse at his next visit    3. Paroxysmal atrial fibrillation (HCC)  Patient needs to establish with local cardiology and rhythm appears regular in the office today is on Eliquis and digoxin.  - REFERRAL TO CARDIOLOGY    4. Essential hypertension  Blood pressure controlled on his lisinopril.    5. Chronic bronchitis, unspecified chronic bronchitis type (HCC)  Patient no longer smoking cigarettes and is using his oxygen in the office.    6. Coronary artery disease involving native heart without angina pectoris, unspecified vessel or lesion type  Patient on statin and had recent lab work done through the hospital.  Establish with local cardiology  - REFERRAL TO CARDIOLOGY    7. Benign prostatic hyperplasia with urinary hesitancy  Patient on Proscar and Flomax.

## 2019-03-18 ENCOUNTER — OFFICE VISIT (OUTPATIENT)
Dept: MEDICAL GROUP | Facility: MEDICAL CENTER | Age: 79
End: 2019-03-18
Payer: MEDICARE

## 2019-03-18 VITALS
HEIGHT: 69 IN | RESPIRATION RATE: 18 BRPM | BODY MASS INDEX: 36.73 KG/M2 | TEMPERATURE: 97.6 F | WEIGHT: 248 LBS | DIASTOLIC BLOOD PRESSURE: 80 MMHG | OXYGEN SATURATION: 92 % | SYSTOLIC BLOOD PRESSURE: 146 MMHG | HEART RATE: 92 BPM

## 2019-03-18 DIAGNOSIS — G89.29 CHRONIC PAIN OF BOTH LOWER EXTREMITIES: ICD-10-CM

## 2019-03-18 DIAGNOSIS — Z79.4 TYPE 2 DIABETES MELLITUS WITH OTHER CIRCULATORY COMPLICATION, WITH LONG-TERM CURRENT USE OF INSULIN (HCC): ICD-10-CM

## 2019-03-18 DIAGNOSIS — M25.512 CHRONIC PAIN OF BOTH SHOULDERS: ICD-10-CM

## 2019-03-18 DIAGNOSIS — I10 ESSENTIAL HYPERTENSION: ICD-10-CM

## 2019-03-18 DIAGNOSIS — E11.59 TYPE 2 DIABETES MELLITUS WITH OTHER CIRCULATORY COMPLICATION, WITH LONG-TERM CURRENT USE OF INSULIN (HCC): ICD-10-CM

## 2019-03-18 DIAGNOSIS — I48.0 PAROXYSMAL ATRIAL FIBRILLATION (HCC): ICD-10-CM

## 2019-03-18 DIAGNOSIS — M79.604 CHRONIC PAIN OF BOTH LOWER EXTREMITIES: ICD-10-CM

## 2019-03-18 DIAGNOSIS — M25.511 CHRONIC PAIN OF BOTH SHOULDERS: ICD-10-CM

## 2019-03-18 DIAGNOSIS — G89.29 CHRONIC PAIN OF BOTH SHOULDERS: ICD-10-CM

## 2019-03-18 DIAGNOSIS — E78.5 DYSLIPIDEMIA: ICD-10-CM

## 2019-03-18 DIAGNOSIS — M79.605 CHRONIC PAIN OF BOTH LOWER EXTREMITIES: ICD-10-CM

## 2019-03-18 PROCEDURE — 99213 OFFICE O/P EST LOW 20 MIN: CPT | Performed by: NURSE PRACTITIONER

## 2019-03-18 ASSESSMENT — PAIN SCALES - GENERAL: PAINLEVEL: 10=SEVERE PAIN

## 2019-03-18 ASSESSMENT — ENCOUNTER SYMPTOMS: MYALGIAS: 1

## 2019-03-18 ASSESSMENT — PATIENT HEALTH QUESTIONNAIRE - PHQ9: CLINICAL INTERPRETATION OF PHQ2 SCORE: 0

## 2019-03-19 ENCOUNTER — HOSPITAL ENCOUNTER (OUTPATIENT)
Dept: LAB | Facility: MEDICAL CENTER | Age: 79
End: 2019-03-19
Attending: NURSE PRACTITIONER
Payer: MEDICARE

## 2019-03-19 DIAGNOSIS — E78.5 DYSLIPIDEMIA: ICD-10-CM

## 2019-03-19 DIAGNOSIS — Z79.4 TYPE 2 DIABETES MELLITUS WITH OTHER CIRCULATORY COMPLICATION, WITH LONG-TERM CURRENT USE OF INSULIN (HCC): ICD-10-CM

## 2019-03-19 DIAGNOSIS — I10 ESSENTIAL HYPERTENSION: ICD-10-CM

## 2019-03-19 DIAGNOSIS — E11.59 TYPE 2 DIABETES MELLITUS WITH OTHER CIRCULATORY COMPLICATION, WITH LONG-TERM CURRENT USE OF INSULIN (HCC): ICD-10-CM

## 2019-03-19 LAB
ALBUMIN SERPL BCP-MCNC: 3.9 G/DL (ref 3.2–4.9)
ALBUMIN/GLOB SERPL: 1.3 G/DL
ALP SERPL-CCNC: 142 U/L (ref 30–99)
ALT SERPL-CCNC: 25 U/L (ref 2–50)
ANION GAP SERPL CALC-SCNC: 6 MMOL/L (ref 0–11.9)
AST SERPL-CCNC: 25 U/L (ref 12–45)
BILIRUB SERPL-MCNC: 0.6 MG/DL (ref 0.1–1.5)
BUN SERPL-MCNC: 26 MG/DL (ref 8–22)
CALCIUM SERPL-MCNC: 9.1 MG/DL (ref 8.5–10.5)
CHLORIDE SERPL-SCNC: 102 MMOL/L (ref 96–112)
CHOLEST SERPL-MCNC: 173 MG/DL (ref 100–199)
CO2 SERPL-SCNC: 25 MMOL/L (ref 20–33)
CREAT SERPL-MCNC: 0.91 MG/DL (ref 0.5–1.4)
CREAT UR-MCNC: 52.4 MG/DL
EST. AVERAGE GLUCOSE BLD GHB EST-MCNC: 235 MG/DL
FASTING STATUS PATIENT QL REPORTED: NORMAL
GLOBULIN SER CALC-MCNC: 2.9 G/DL (ref 1.9–3.5)
GLUCOSE SERPL-MCNC: 126 MG/DL (ref 65–99)
HBA1C MFR BLD: 9.8 % (ref 0–5.6)
HDLC SERPL-MCNC: 31 MG/DL
LDLC SERPL CALC-MCNC: 84 MG/DL
MICROALBUMIN UR-MCNC: 4.5 MG/DL
MICROALBUMIN/CREAT UR: 86 MG/G (ref 0–30)
POTASSIUM SERPL-SCNC: 4.2 MMOL/L (ref 3.6–5.5)
PROT SERPL-MCNC: 6.8 G/DL (ref 6–8.2)
SODIUM SERPL-SCNC: 133 MMOL/L (ref 135–145)
TRIGL SERPL-MCNC: 290 MG/DL (ref 0–149)

## 2019-03-19 PROCEDURE — 80053 COMPREHEN METABOLIC PANEL: CPT

## 2019-03-19 PROCEDURE — 80061 LIPID PANEL: CPT

## 2019-03-19 PROCEDURE — 36415 COLL VENOUS BLD VENIPUNCTURE: CPT

## 2019-03-19 PROCEDURE — 82570 ASSAY OF URINE CREATININE: CPT

## 2019-03-19 PROCEDURE — 83036 HEMOGLOBIN GLYCOSYLATED A1C: CPT

## 2019-03-19 PROCEDURE — 82043 UR ALBUMIN QUANTITATIVE: CPT

## 2019-04-17 ENCOUNTER — OFFICE VISIT (OUTPATIENT)
Dept: CARDIOLOGY | Facility: MEDICAL CENTER | Age: 79
End: 2019-04-17
Payer: MEDICARE

## 2019-04-17 VITALS
BODY MASS INDEX: 37.67 KG/M2 | DIASTOLIC BLOOD PRESSURE: 76 MMHG | WEIGHT: 240 LBS | HEART RATE: 80 BPM | SYSTOLIC BLOOD PRESSURE: 138 MMHG | HEIGHT: 67 IN

## 2019-04-17 DIAGNOSIS — I10 ESSENTIAL HYPERTENSION: ICD-10-CM

## 2019-04-17 DIAGNOSIS — I48.20 CHRONIC ATRIAL FIBRILLATION (HCC): ICD-10-CM

## 2019-04-17 DIAGNOSIS — E11.51 TYPE 2 DIABETES MELLITUS WITH DIABETIC PERIPHERAL ANGIOPATHY WITHOUT GANGRENE, UNSPECIFIED WHETHER LONG TERM INSULIN USE (HCC): ICD-10-CM

## 2019-04-17 PROBLEM — I48.0 PAROXYSMAL ATRIAL FIBRILLATION (HCC): Status: RESOLVED | Noted: 2018-10-21 | Resolved: 2019-04-17

## 2019-04-17 PROCEDURE — 99204 OFFICE O/P NEW MOD 45 MIN: CPT | Performed by: INTERNAL MEDICINE

## 2019-04-17 RX ORDER — ACETAMINOPHEN 500 MG
500-1000 TABLET ORAL EVERY 6 HOURS PRN
Status: ON HOLD | COMMUNITY
End: 2019-11-10

## 2019-04-17 RX ORDER — HYDROCODONE BITARTRATE AND ACETAMINOPHEN 5; 325 MG/1; MG/1
1-2 TABLET ORAL EVERY 4 HOURS PRN
Status: ON HOLD | COMMUNITY
End: 2019-11-10

## 2019-04-17 ASSESSMENT — ENCOUNTER SYMPTOMS
DEPRESSION: 0
PND: 0
EYE DISCHARGE: 0
HEADACHES: 0
SHORTNESS OF BREATH: 1
NAUSEA: 0
BRUISES/BLEEDS EASILY: 0
CHILLS: 0
HEARTBURN: 0
MYALGIAS: 0
NERVOUS/ANXIOUS: 0
FEVER: 0
SENSORY CHANGE: 1
BLURRED VISION: 0
BACK PAIN: 1
DIZZINESS: 0
COUGH: 0
PALPITATIONS: 0

## 2019-04-17 NOTE — PROGRESS NOTES
Chief Complaint   Patient presents with   • Atrial Fibrillation       Subjective:   Neil De Souza is a 79 y.o. male who presents today in consultation at the request of Tomas Lacy for atrial fibrillation and shortness of breath.    This obese man with a prior history of diabetes mellitus, not well controlled, painful peripheral neuropathy disturbed sleep, orthopnea and effort dyspnea comes in for evaluation.    In October 2018 he had a painful pulses right leg that required thrombectomy by Dr. Rodriguez with good results.  He was placed on Eliquis.  It was noted by EKG that he was in atrial fibrillation at that time.    Since that hospitalization he is done fairly well and although obese and using oxygen at night for sleep apnea and COPD, he does some light yardwork during the summer without chest pressure or change in his dyspnea.  He reports a chronic history of using several pillows at night to sleep with his upper torso elevated due to shortness of breath.  He reports some mild peripheral edema.  He is having bloody nasal discharge but no true epistaxis.    He is never had an echocardiogram.    There is a prior history of tobacco use for 40 years and he does carry the diagnosis of COPD and sleep apnea.    Recent lab work from 1 month ago demonstrates a hemoglobin A1c of greater than 9.8 and an LDL of 84.  Triglycerides 290.  HDL 31.  Mild elevation of alkaline phosphatase is also noted.    EKG from October 2018 demonstrates atrial fibrillation with satisfactory ventricular response, otherwise normal    Past Medical History:   Diagnosis Date   • Chronic obstructive pulmonary disease (HCC)    • Congestive heart failure (HCC)    • Diabetes (HCC)      Past Surgical History:   Procedure Laterality Date   • THROMBECTOMY Right 10/21/2018    Procedure: THROMBECTOMY;  Surgeon: Milagro Rodriguez M.D.;  Location: SURGERY [facility];  Service: General   • ANGIOGRAM Right 10/21/2018    Procedure: ANGIOGRAM;  Surgeon:  Milagro Rodriguez M.D.;  Location: SURGERY [facility];  Service: General   • CHOLECYSTECTOMY       Family History   Problem Relation Age of Onset   • Hypertension Mother    • No Known Problems Father      Social History     Social History   • Marital status:      Spouse name: N/A   • Number of children: N/A   • Years of education: N/A     Occupational History   • Not on file.     Social History Main Topics   • Smoking status: Former Smoker     Types: Pipe, Cigars   • Smokeless tobacco: Former User   • Alcohol use No      Comment: hx alcohol abuse   • Drug use: No      Comment: navin   • Sexual activity: Not Currently     Other Topics Concern   • Not on file     Social History Narrative   • No narrative on file     Allergies   Allergen Reactions   • Gabapentin      Outpatient Encounter Prescriptions as of 4/17/2019   Medication Sig Dispense Refill   • aspirin EC (ECOTRIN) 81 MG Tablet Delayed Response Take 81 mg by mouth every day.     • HYDROcodone-acetaminophen (NORCO) 5-325 MG Tab per tablet Take 1-2 Tabs by mouth every four hours as needed.     • acetaminophen (TYLENOL) 500 MG Tab Take 500-1,000 mg by mouth every 6 hours as needed.     • furosemide (LASIX) 40 MG Tab Take 40 mg by mouth every day.     • citalopram (CELEXA) 20 MG Tab Take 20 mg by mouth every day.     • tamsulosin (FLOMAX) 0.4 MG capsule Take 0.4 mg by mouth ONE-HALF HOUR AFTER BREAKFAST.     • atorvastatin (LIPITOR) 40 MG Tab Take 40 mg by mouth every evening.     • Metoprolol Succinate 25 MG Capsule ER 24 Hour Sprinkle Take  by mouth.     • apixaban (ELIQUIS) 5mg Tab Take 1 Tab by mouth 2 Times a Day. 60 Tab    • lisinopril (PRINIVIL) 20 MG Tab Take 1 Tab by mouth every day. 30 Tab    • docusate sodium 100 MG Cap Take 100 mg by mouth 2 Times a Day. 60 Cap    • insulin regular (HUMULIN R) 100 Unit/mL Solution Inject 2-9 Units as instructed 4 Times a Day,Before Meals and at Bedtime. 10 mL    • digoxin (LANOXIN) 125 MCG Tab Take 125 mcg by  "mouth every morning.     • finasteride (PROSCAR) 5 MG Tab Take 5 mg by mouth every morning.     • insulin glargine (LANTUS SOLOSTAR) 100 UNIT/ML Solution Pen-injector injection Inject 20 Units as instructed every evening.     • multivitamin (THERAGRAN) Tab Take 1 Tab by mouth every morning.     • omeprazole (PRILOSEC) 20 MG delayed-release capsule Take 40 mg by mouth every morning before breakfast.     • raNITidine (ZANTAC) 150 MG Tab Take 150 mg by mouth 2 times a day.     • albuterol (PROVENTIL) 2.5mg/0.5ml Nebu Soln 2.5 mg by Nebulization route every four hours as needed for Shortness of Breath.     • [DISCONTINUED] glimepiride (AMARYL) 4 MG Tab Take 4 mg by mouth 2 times a day.     • [DISCONTINUED] traZODone (DESYREL) 50 MG Tab Take 50 mg by mouth at bedtime as needed for Sleep.       No facility-administered encounter medications on file as of 4/17/2019.      Review of Systems   Constitutional: Negative for chills, fever and malaise/fatigue.   Eyes: Negative for blurred vision and discharge.   Respiratory: Positive for shortness of breath. Negative for cough.    Cardiovascular: Positive for leg swelling. Negative for chest pain, palpitations and PND.   Gastrointestinal: Negative for heartburn and nausea.   Genitourinary: Positive for frequency. Negative for dysuria and urgency.   Musculoskeletal: Positive for back pain and joint pain. Negative for myalgias.   Skin: Negative for itching and rash.   Neurological: Positive for sensory change. Negative for dizziness and headaches.   Endo/Heme/Allergies: Negative for environmental allergies. Does not bruise/bleed easily.   Psychiatric/Behavioral: Negative for depression. The patient is not nervous/anxious.         Objective:   /76 (BP Location: Left arm, Patient Position: Sitting, BP Cuff Size: Adult)   Pulse 80   Ht 1.702 m (5' 7\")   Wt 108.9 kg (240 lb)   BMI 37.59 kg/m²     Physical Exam   Constitutional: He is oriented to person, place, and time. "   Pleasant severely obese man with increased respiratory rate but not in respiratory distress, accompanied by daughter   Neck: No JVD present.   Cardiovascular: Normal rate.  An irregularly irregular rhythm present. Exam reveals no gallop and no friction rub.    No murmur heard.  Pulses:       Carotid pulses are 3+ on the right side, and 3+ on the left side.       Radial pulses are 3+ on the right side, and 3+ on the left side.        Dorsalis pedis pulses are 0 on the right side, and 1+ on the left side.        Posterior tibial pulses are 3+ on the right side, and 3+ on the left side.   Pulmonary/Chest: Breath sounds normal.   Abdominal: Soft. There is no tenderness.   Musculoskeletal: He exhibits edema.   Mild ankle edema at sock line, symmetric   Neurological: He is alert and oriented to person, place, and time.   Skin: Skin is warm and dry.       Assessment:     1. Chronic atrial fibrillation (HCC)  EC-ECHOCARDIOGRAM COMPLETE W/O CONT   2. Essential hypertension  EC-ECHOCARDIOGRAM COMPLETE W/O CONT   3. Type 2 diabetes mellitus with diabetic peripheral angiopathy without gangrene, unspecified whether long term insulin use (HCC)         Medical Decision Making:  Today's Assessment / Status / Plan:   In general this patient's medication list is reasonable.  Hemoglobin A1c however is not reasonable at 9.8 and the family is aware of this.    Although he has some mild ankle edema, neck veins are nondistended.  He does need an echocardiogram and medications may need to be adjusted depending on results.  Ideally LDL should be lower this decision is deferred to his PCP  Blood sugars should certainly be lower.  This decision is deferred to his PCP.  Because of his painful peripheral neuropathy I suggested that toenail care being done by someone other than himself.    Follow-up here after echocardiogram.  Thank you for this consultation.

## 2019-04-17 NOTE — LETTER
[facility]   [address], [address]  [place] [address]  Phone: [phone]  Fax: [phone]              Neil De Souza  1940    Encounter Date: 4/17/2019    Adam Katz M.D.          PROGRESS NOTE:  Chief Complaint   Patient presents with   • Atrial Fibrillation       Subjective:   Neil De Souza is a 79 y.o. male who presents today in consultation at the request of Tomas Lacy for atrial fibrillation and shortness of breath.    This obese man with a prior history of diabetes mellitus, not well controlled, painful peripheral neuropathy disturbed sleep, orthopnea and effort dyspnea comes in for evaluation.    In October 2018 he had a painful pulses right leg that required thrombectomy by Dr. Rodriguez with good results.  He was placed on Eliquis.  It was noted by EKG that he was in atrial fibrillation at that time.    Since that hospitalization he is done fairly well and although obese and using oxygen at night for sleep apnea and COPD, he does some light yardwork during the summer without chest pressure or change in his dyspnea.  He reports a chronic history of using several pillows at night to sleep with his upper torso elevated due to shortness of breath.  He reports some mild peripheral edema.  He is having bloody nasal discharge but no true epistaxis.    He is never had an echocardiogram.    There is a prior history of tobacco use for 40 years and he does carry the diagnosis of COPD and sleep apnea.    Recent lab work from 1 month ago demonstrates a hemoglobin A1c of greater than 9.8 and an LDL of 84.  Triglycerides 290.  HDL 31.  Mild elevation of alkaline phosphatase is also noted.    EKG from October 2018 demonstrates atrial fibrillation with satisfactory ventricular response, otherwise normal    Past Medical History:   Diagnosis Date   • Chronic obstructive pulmonary disease (HCC)    • Congestive heart failure (HCC)    • Diabetes (HCC)      Past  Surgical History:   Procedure Laterality Date   • THROMBECTOMY Right 10/21/2018    Procedure: THROMBECTOMY;  Surgeon: Milagro Rodriguez M.D.;  Location: SURGERY [facility];  Service: General   • ANGIOGRAM Right 10/21/2018    Procedure: ANGIOGRAM;  Surgeon: Milagro Rodriguez M.D.;  Location: SURGERY [facility];  Service: General   • CHOLECYSTECTOMY       Family History   Problem Relation Age of Onset   • Hypertension Mother    • No Known Problems Father      Social History     Social History   • Marital status:      Spouse name: N/A   • Number of children: N/A   • Years of education: N/A     Occupational History   • Not on file.     Social History Main Topics   • Smoking status: Former Smoker     Types: Pipe, Cigars   • Smokeless tobacco: Former User   • Alcohol use No      Comment: hx alcohol abuse   • Drug use: No      Comment: navin   • Sexual activity: Not Currently     Other Topics Concern   • Not on file     Social History Narrative   • No narrative on file     Allergies   Allergen Reactions   • Gabapentin      Outpatient Encounter Prescriptions as of 4/17/2019   Medication Sig Dispense Refill   • aspirin EC (ECOTRIN) 81 MG Tablet Delayed Response Take 81 mg by mouth every day.     • HYDROcodone-acetaminophen (NORCO) 5-325 MG Tab per tablet Take 1-2 Tabs by mouth every four hours as needed.     • acetaminophen (TYLENOL) 500 MG Tab Take 500-1,000 mg by mouth every 6 hours as needed.     • furosemide (LASIX) 40 MG Tab Take 40 mg by mouth every day.     • citalopram (CELEXA) 20 MG Tab Take 20 mg by mouth every day.     • tamsulosin (FLOMAX) 0.4 MG capsule Take 0.4 mg by mouth ONE-HALF HOUR AFTER BREAKFAST.     • atorvastatin (LIPITOR) 40 MG Tab Take 40 mg by mouth every evening.     • Metoprolol Succinate 25 MG Capsule ER 24 Hour Sprinkle Take  by mouth.     • apixaban (ELIQUIS) 5mg Tab Take 1 Tab by mouth 2 Times a Day. 60 Tab    • lisinopril (PRINIVIL) 20 MG Tab Take 1 Tab by mouth every day. 30 Tab      • docusate sodium 100 MG Cap Take 100 mg by mouth 2 Times a Day. 60 Cap    • insulin regular (HUMULIN R) 100 Unit/mL Solution Inject 2-9 Units as instructed 4 Times a Day,Before Meals and at Bedtime. 10 mL    • digoxin (LANOXIN) 125 MCG Tab Take 125 mcg by mouth every morning.     • finasteride (PROSCAR) 5 MG Tab Take 5 mg by mouth every morning.     • insulin glargine (LANTUS SOLOSTAR) 100 UNIT/ML Solution Pen-injector injection Inject 20 Units as instructed every evening.     • multivitamin (THERAGRAN) Tab Take 1 Tab by mouth every morning.     • omeprazole (PRILOSEC) 20 MG delayed-release capsule Take 40 mg by mouth every morning before breakfast.     • raNITidine (ZANTAC) 150 MG Tab Take 150 mg by mouth 2 times a day.     • albuterol (PROVENTIL) 2.5mg/0.5ml Nebu Soln 2.5 mg by Nebulization route every four hours as needed for Shortness of Breath.     • [DISCONTINUED] glimepiride (AMARYL) 4 MG Tab Take 4 mg by mouth 2 times a day.     • [DISCONTINUED] traZODone (DESYREL) 50 MG Tab Take 50 mg by mouth at bedtime as needed for Sleep.       No facility-administered encounter medications on file as of 4/17/2019.      Review of Systems   Constitutional: Negative for chills, fever and malaise/fatigue.   Eyes: Negative for blurred vision and discharge.   Respiratory: Positive for shortness of breath. Negative for cough.    Cardiovascular: Positive for leg swelling. Negative for chest pain, palpitations and PND.   Gastrointestinal: Negative for heartburn and nausea.   Genitourinary: Positive for frequency. Negative for dysuria and urgency.   Musculoskeletal: Positive for back pain and joint pain. Negative for myalgias.   Skin: Negative for itching and rash.   Neurological: Positive for sensory change. Negative for dizziness and headaches.   Endo/Heme/Allergies: Negative for environmental allergies. Does not bruise/bleed easily.   Psychiatric/Behavioral: Negative for depression. The patient is not nervous/anxious.   "       Objective:   /76 (BP Location: Left arm, Patient Position: Sitting, BP Cuff Size: Adult)   Pulse 80   Ht 1.702 m (5' 7\")   Wt 108.9 kg (240 lb)   BMI 37.59 kg/m²      Physical Exam   Constitutional: He is oriented to person, place, and time.   Pleasant severely obese man with increased respiratory rate but not in respiratory distress, accompanied by daughter   Neck: No JVD present.   Cardiovascular: Normal rate.  An irregularly irregular rhythm present. Exam reveals no gallop and no friction rub.    No murmur heard.  Pulses:       Carotid pulses are 3+ on the right side, and 3+ on the left side.       Radial pulses are 3+ on the right side, and 3+ on the left side.        Dorsalis pedis pulses are 0 on the right side, and 1+ on the left side.        Posterior tibial pulses are 3+ on the right side, and 3+ on the left side.   Pulmonary/Chest: Breath sounds normal.   Abdominal: Soft. There is no tenderness.   Musculoskeletal: He exhibits edema.   Mild ankle edema at sock line, symmetric   Neurological: He is alert and oriented to person, place, and time.   Skin: Skin is warm and dry.       Assessment:     1. Chronic atrial fibrillation (HCC)  EC-ECHOCARDIOGRAM COMPLETE W/O CONT   2. Essential hypertension  EC-ECHOCARDIOGRAM COMPLETE W/O CONT   3. Type 2 diabetes mellitus with diabetic peripheral angiopathy without gangrene, unspecified whether long term insulin use (HCC)         Medical Decision Making:  Today's Assessment / Status / Plan:   In general this patient's medication list is reasonable.  Hemoglobin A1c however is not reasonable at 9.8 and the family is aware of this.    Although he has some mild ankle edema, neck veins are nondistended.  He does need an echocardiogram and medications may need to be adjusted depending on results.  Ideally LDL should be lower this decision is deferred to his PCP  Blood sugars should certainly be lower.  This decision is deferred to his PCP.  Because of his " painful peripheral neuropathy I suggested that toenail care being done by someone other than himself.    Follow-up here after echocardiogram.  Thank you for this consultation.      HAYDER Tomas  [address] [place] [address] [place] [id number]  VIA In Basket

## 2019-04-18 ENCOUNTER — OFFICE VISIT (OUTPATIENT)
Dept: URGENT CARE | Facility: PHYSICIAN GROUP | Age: 79
End: 2019-04-18
Payer: MEDICARE

## 2019-04-18 VITALS
DIASTOLIC BLOOD PRESSURE: 66 MMHG | OXYGEN SATURATION: 92 % | TEMPERATURE: 100.2 F | HEIGHT: 69 IN | BODY MASS INDEX: 36.29 KG/M2 | HEART RATE: 96 BPM | SYSTOLIC BLOOD PRESSURE: 138 MMHG | RESPIRATION RATE: 20 BRPM | WEIGHT: 245 LBS

## 2019-04-18 DIAGNOSIS — H10.33 ACUTE BACTERIAL CONJUNCTIVITIS OF BOTH EYES: ICD-10-CM

## 2019-04-18 PROCEDURE — 99203 OFFICE O/P NEW LOW 30 MIN: CPT | Performed by: FAMILY MEDICINE

## 2019-04-19 NOTE — PROGRESS NOTES
"Subjective:     Neil De Souza is a 79 y.o. male who presents for Ear Drainage (L eye drainage, redness, itches x 1 week)       Ear Drainage   This is a new problem. The current episode started 1 to 4 weeks ago. The problem occurs constantly. The problem has been rapidly worsening. Associated symptoms include a fever. Pertinent negatives include no nausea, sore throat or vomiting.     Review of Systems   Constitutional: Positive for fever.   HENT: Negative for sore throat.    Gastrointestinal: Negative for nausea and vomiting.     Allergies   Allergen Reactions   • Gabapentin       Objective:   /66 (BP Location: Left arm, Patient Position: Sitting, BP Cuff Size: Adult)   Pulse 96   Temp 37.9 °C (100.2 °F) (Temporal)   Resp 20   Ht 1.753 m (5' 9\")   Wt 111.1 kg (245 lb)   SpO2 92%   BMI 36.18 kg/m²   Physical Exam   Constitutional: He is oriented to person, place, and time. He appears well-developed and well-nourished. No distress.   HENT:   Head: Normocephalic and atraumatic.   Right Ear: Hearing, tympanic membrane, external ear and ear canal normal.   Left Ear: Hearing, tympanic membrane, external ear and ear canal normal.   Eyes: Pupils are equal, round, and reactive to light. EOM are normal. Right eye exhibits discharge. Right eye exhibits no chemosis. Left eye exhibits discharge. Right conjunctiva is injected. Right conjunctiva has no hemorrhage. Left conjunctiva is injected. Left conjunctiva has no hemorrhage.   Cardiovascular: Normal rate and regular rhythm.    No murmur heard.  Pulmonary/Chest: Effort normal and breath sounds normal. No respiratory distress.   Abdominal: Soft. He exhibits no distension. There is no tenderness.   Neurological: He is alert and oriented to person, place, and time. He has normal reflexes. No sensory deficit.   Skin: Skin is warm and dry.   Psychiatric: He has a normal mood and affect.        Assessment/Plan:   1. Acute bacterial conjunctivitis of both eyes  - " azithromycin (AZASITE) 1 % ophthalmic solution; Place 1 Drop in both eyes every day.  Dispense: 2.5 mL; Refill: 0    Differential diagnosis, natural history, supportive care, and indications for immediate follow-up discussed.

## 2019-04-22 ASSESSMENT — ENCOUNTER SYMPTOMS
SORE THROAT: 0
VOMITING: 0
NAUSEA: 0
FEVER: 1

## 2019-05-21 ENCOUNTER — HOSPITAL ENCOUNTER (OUTPATIENT)
Dept: CARDIOLOGY | Facility: MEDICAL CENTER | Age: 79
End: 2019-05-21
Attending: INTERNAL MEDICINE
Payer: MEDICARE

## 2019-05-21 DIAGNOSIS — I48.20 CHRONIC ATRIAL FIBRILLATION (HCC): ICD-10-CM

## 2019-05-21 DIAGNOSIS — I10 ESSENTIAL HYPERTENSION: ICD-10-CM

## 2019-05-21 PROCEDURE — 93306 TTE W/DOPPLER COMPLETE: CPT

## 2019-05-22 LAB
LV EJECT FRACT  99904: 60
LV EJECT FRACT MOD 4C 99902: 55.24

## 2019-05-22 PROCEDURE — 93306 TTE W/DOPPLER COMPLETE: CPT | Mod: 26 | Performed by: INTERNAL MEDICINE

## 2019-05-28 ENCOUNTER — TELEPHONE (OUTPATIENT)
Dept: CARDIOLOGY | Facility: MEDICAL CENTER | Age: 79
End: 2019-05-28

## 2019-05-29 NOTE — TELEPHONE ENCOUNTER
----- Message from Adam Katz M.D. sent at 5/28/2019  9:59 AM PDT -----  Echocardiogram results very reassuring.  His medications appear to be adequate for current health status.  It is not necessary for him to return in follow-up.  Continue current meds unchanged.

## 2019-11-10 ENCOUNTER — APPOINTMENT (OUTPATIENT)
Dept: RADIOLOGY | Facility: MEDICAL CENTER | Age: 79
End: 2019-11-10
Attending: EMERGENCY MEDICINE
Payer: MEDICARE

## 2019-11-10 ENCOUNTER — HOSPITAL ENCOUNTER (OUTPATIENT)
Facility: MEDICAL CENTER | Age: 79
End: 2019-11-11
Attending: EMERGENCY MEDICINE | Admitting: HOSPITALIST
Payer: MEDICARE

## 2019-11-10 DIAGNOSIS — E86.0 DEHYDRATION: ICD-10-CM

## 2019-11-10 LAB
ALBUMIN SERPL BCP-MCNC: 4.2 G/DL (ref 3.2–4.9)
ALBUMIN/GLOB SERPL: 1.4 G/DL
ALP SERPL-CCNC: 167 U/L (ref 30–99)
ALT SERPL-CCNC: 21 U/L (ref 2–50)
ANION GAP SERPL CALC-SCNC: 12 MMOL/L (ref 0–11.9)
APTT PPP: 29.4 SEC (ref 24.7–36)
AST SERPL-CCNC: 21 U/L (ref 12–45)
BASOPHILS # BLD AUTO: 0.6 % (ref 0–1.8)
BASOPHILS # BLD: 0.05 K/UL (ref 0–0.12)
BILIRUB SERPL-MCNC: 0.7 MG/DL (ref 0.1–1.5)
BUN SERPL-MCNC: 30 MG/DL (ref 8–22)
CALCIUM SERPL-MCNC: 9.4 MG/DL (ref 8.5–10.5)
CHLORIDE SERPL-SCNC: 101 MMOL/L (ref 96–112)
CO2 SERPL-SCNC: 21 MMOL/L (ref 20–33)
CREAT SERPL-MCNC: 1.67 MG/DL (ref 0.5–1.4)
EOSINOPHIL # BLD AUTO: 0.14 K/UL (ref 0–0.51)
EOSINOPHIL NFR BLD: 1.8 % (ref 0–6.9)
ERYTHROCYTE [DISTWIDTH] IN BLOOD BY AUTOMATED COUNT: 45.7 FL (ref 35.9–50)
GLOBULIN SER CALC-MCNC: 2.9 G/DL (ref 1.9–3.5)
GLUCOSE SERPL-MCNC: 288 MG/DL (ref 65–99)
HCT VFR BLD AUTO: 49.4 % (ref 42–52)
HGB BLD-MCNC: 16.4 G/DL (ref 14–18)
IMM GRANULOCYTES # BLD AUTO: 0.04 K/UL (ref 0–0.11)
IMM GRANULOCYTES NFR BLD AUTO: 0.5 % (ref 0–0.9)
INR PPP: 0.95 (ref 0.87–1.13)
LYMPHOCYTES # BLD AUTO: 2.73 K/UL (ref 1–4.8)
LYMPHOCYTES NFR BLD: 34.8 % (ref 22–41)
MCH RBC QN AUTO: 32.5 PG (ref 27–33)
MCHC RBC AUTO-ENTMCNC: 33.2 G/DL (ref 33.7–35.3)
MCV RBC AUTO: 97.8 FL (ref 81.4–97.8)
MONOCYTES # BLD AUTO: 1.06 K/UL (ref 0–0.85)
MONOCYTES NFR BLD AUTO: 13.5 % (ref 0–13.4)
NEUTROPHILS # BLD AUTO: 3.83 K/UL (ref 1.82–7.42)
NEUTROPHILS NFR BLD: 48.8 % (ref 44–72)
NRBC # BLD AUTO: 0 K/UL
NRBC BLD-RTO: 0 /100 WBC
PLATELET # BLD AUTO: 184 K/UL (ref 164–446)
PMV BLD AUTO: 10 FL (ref 9–12.9)
POTASSIUM SERPL-SCNC: 3.9 MMOL/L (ref 3.6–5.5)
PROT SERPL-MCNC: 7.1 G/DL (ref 6–8.2)
PROTHROMBIN TIME: 12.8 SEC (ref 12–14.6)
RBC # BLD AUTO: 5.05 M/UL (ref 4.7–6.1)
SODIUM SERPL-SCNC: 134 MMOL/L (ref 135–145)
WBC # BLD AUTO: 7.9 K/UL (ref 4.8–10.8)

## 2019-11-10 PROCEDURE — 85730 THROMBOPLASTIN TIME PARTIAL: CPT

## 2019-11-10 PROCEDURE — 99220 PR INITIAL OBSERVATION CARE,LEVL III: CPT | Performed by: HOSPITALIST

## 2019-11-10 PROCEDURE — 80053 COMPREHEN METABOLIC PANEL: CPT

## 2019-11-10 PROCEDURE — G0378 HOSPITAL OBSERVATION PER HR: HCPCS

## 2019-11-10 PROCEDURE — 82962 GLUCOSE BLOOD TEST: CPT

## 2019-11-10 PROCEDURE — 85610 PROTHROMBIN TIME: CPT

## 2019-11-10 PROCEDURE — 72131 CT LUMBAR SPINE W/O DYE: CPT

## 2019-11-10 PROCEDURE — 36415 COLL VENOUS BLD VENIPUNCTURE: CPT

## 2019-11-10 PROCEDURE — 700105 HCHG RX REV CODE 258: Performed by: EMERGENCY MEDICINE

## 2019-11-10 PROCEDURE — 85025 COMPLETE CBC W/AUTO DIFF WBC: CPT

## 2019-11-10 PROCEDURE — 99285 EMERGENCY DEPT VISIT HI MDM: CPT

## 2019-11-10 PROCEDURE — 73700 CT LOWER EXTREMITY W/O DYE: CPT | Mod: RT

## 2019-11-10 RX ORDER — HEPARIN SODIUM 5000 [USP'U]/ML
5000 INJECTION, SOLUTION INTRAVENOUS; SUBCUTANEOUS EVERY 8 HOURS
Status: DISCONTINUED | OUTPATIENT
Start: 2019-11-10 | End: 2019-11-10

## 2019-11-10 RX ORDER — SODIUM CHLORIDE 9 MG/ML
1000 INJECTION, SOLUTION INTRAVENOUS ONCE
Status: COMPLETED | OUTPATIENT
Start: 2019-11-10 | End: 2019-11-10

## 2019-11-10 RX ORDER — AMOXICILLIN 250 MG
2 CAPSULE ORAL 2 TIMES DAILY
Status: DISCONTINUED | OUTPATIENT
Start: 2019-11-10 | End: 2019-11-11 | Stop reason: HOSPADM

## 2019-11-10 RX ORDER — POLYETHYLENE GLYCOL 3350 17 G/17G
1 POWDER, FOR SOLUTION ORAL
Status: DISCONTINUED | OUTPATIENT
Start: 2019-11-10 | End: 2019-11-11 | Stop reason: HOSPADM

## 2019-11-10 RX ORDER — GLIMEPIRIDE 4 MG/1
4 TABLET ORAL 2 TIMES DAILY
Refills: 2 | COMMUNITY
Start: 2019-10-13

## 2019-11-10 RX ORDER — FINASTERIDE 5 MG/1
5 TABLET, FILM COATED ORAL EVERY MORNING
Status: DISCONTINUED | OUTPATIENT
Start: 2019-11-11 | End: 2019-11-11 | Stop reason: HOSPADM

## 2019-11-10 RX ORDER — ONDANSETRON 4 MG/1
4 TABLET, ORALLY DISINTEGRATING ORAL EVERY 4 HOURS PRN
Status: DISCONTINUED | OUTPATIENT
Start: 2019-11-10 | End: 2019-11-11 | Stop reason: HOSPADM

## 2019-11-10 RX ORDER — CITALOPRAM 20 MG/1
20 TABLET ORAL DAILY
Status: DISCONTINUED | OUTPATIENT
Start: 2019-11-11 | End: 2019-11-11 | Stop reason: HOSPADM

## 2019-11-10 RX ORDER — LISINOPRIL 20 MG/1
20 TABLET ORAL DAILY
COMMUNITY

## 2019-11-10 RX ORDER — TAMSULOSIN HYDROCHLORIDE 0.4 MG/1
0.4 CAPSULE ORAL
Status: DISCONTINUED | OUTPATIENT
Start: 2019-11-11 | End: 2019-11-11 | Stop reason: HOSPADM

## 2019-11-10 RX ORDER — PANTOPRAZOLE SODIUM 40 MG/1
40 TABLET, DELAYED RELEASE ORAL DAILY
Refills: 1 | COMMUNITY
Start: 2019-09-13

## 2019-11-10 RX ORDER — SODIUM CHLORIDE 9 MG/ML
INJECTION, SOLUTION INTRAVENOUS CONTINUOUS
Status: DISCONTINUED | OUTPATIENT
Start: 2019-11-10 | End: 2019-11-11 | Stop reason: HOSPADM

## 2019-11-10 RX ORDER — TRAMADOL HYDROCHLORIDE 50 MG/1
50 TABLET ORAL 3 TIMES DAILY PRN
Refills: 0 | COMMUNITY
Start: 2019-10-10

## 2019-11-10 RX ORDER — METOPROLOL SUCCINATE 25 MG/1
25 TABLET, EXTENDED RELEASE ORAL DAILY
COMMUNITY

## 2019-11-10 RX ORDER — BISACODYL 10 MG
10 SUPPOSITORY, RECTAL RECTAL
Status: DISCONTINUED | OUTPATIENT
Start: 2019-11-10 | End: 2019-11-11 | Stop reason: HOSPADM

## 2019-11-10 RX ORDER — INSULIN GLARGINE 100 [IU]/ML
20 INJECTION, SOLUTION SUBCUTANEOUS EVERY EVENING
Status: DISCONTINUED | OUTPATIENT
Start: 2019-11-10 | End: 2019-11-11 | Stop reason: HOSPADM

## 2019-11-10 RX ORDER — TRAMADOL HYDROCHLORIDE 50 MG/1
50 TABLET ORAL 3 TIMES DAILY PRN
Status: DISCONTINUED | OUTPATIENT
Start: 2019-11-10 | End: 2019-11-11 | Stop reason: HOSPADM

## 2019-11-10 RX ORDER — ATORVASTATIN CALCIUM 20 MG/1
40 TABLET, FILM COATED ORAL NIGHTLY
Status: DISCONTINUED | OUTPATIENT
Start: 2019-11-10 | End: 2019-11-11 | Stop reason: HOSPADM

## 2019-11-10 RX ORDER — ONDANSETRON 2 MG/ML
4 INJECTION INTRAMUSCULAR; INTRAVENOUS EVERY 4 HOURS PRN
Status: DISCONTINUED | OUTPATIENT
Start: 2019-11-10 | End: 2019-11-11 | Stop reason: HOSPADM

## 2019-11-10 RX ORDER — METOPROLOL SUCCINATE 25 MG/1
25 TABLET, EXTENDED RELEASE ORAL
Status: DISCONTINUED | OUTPATIENT
Start: 2019-11-11 | End: 2019-11-11 | Stop reason: HOSPADM

## 2019-11-10 RX ORDER — OMEPRAZOLE 20 MG/1
40 CAPSULE, DELAYED RELEASE ORAL
Status: DISCONTINUED | OUTPATIENT
Start: 2019-11-11 | End: 2019-11-11 | Stop reason: HOSPADM

## 2019-11-10 RX ORDER — RANITIDINE 150 MG/1
150 TABLET ORAL 2 TIMES DAILY
Status: DISCONTINUED | OUTPATIENT
Start: 2019-11-10 | End: 2019-11-10

## 2019-11-10 RX ADMIN — SODIUM CHLORIDE 1000 ML: 9 INJECTION, SOLUTION INTRAVENOUS at 19:29

## 2019-11-10 ASSESSMENT — LIFESTYLE VARIABLES
EVER HAD A DRINK FIRST THING IN THE MORNING TO STEADY YOUR NERVES TO GET RID OF A HANGOVER: NO
CONSUMPTION TOTAL: NEGATIVE
DO YOU DRINK ALCOHOL: NO
AVERAGE NUMBER OF DAYS PER WEEK YOU HAVE A DRINK CONTAINING ALCOHOL: 0
ON A TYPICAL DAY WHEN YOU DRINK ALCOHOL HOW MANY DRINKS DO YOU HAVE: 0
ALCOHOL_USE: NO
HAVE PEOPLE ANNOYED YOU BY CRITICIZING YOUR DRINKING: NO
TOTAL SCORE: 0
TOTAL SCORE: 0
HAVE YOU EVER FELT YOU SHOULD CUT DOWN ON YOUR DRINKING: NO
TOTAL SCORE: 0
EVER FELT BAD OR GUILTY ABOUT YOUR DRINKING: NO
EVER_SMOKED: NEVER
HOW MANY TIMES IN THE PAST YEAR HAVE YOU HAD 5 OR MORE DRINKS IN A DAY: 0

## 2019-11-11 ENCOUNTER — PATIENT OUTREACH (OUTPATIENT)
Dept: HEALTH INFORMATION MANAGEMENT | Facility: OTHER | Age: 79
End: 2019-11-11

## 2019-11-11 ENCOUNTER — PATIENT MESSAGE (OUTPATIENT)
Dept: HEALTH INFORMATION MANAGEMENT | Facility: OTHER | Age: 79
End: 2019-11-11

## 2019-11-11 VITALS
OXYGEN SATURATION: 98 % | HEIGHT: 69 IN | WEIGHT: 238.1 LBS | DIASTOLIC BLOOD PRESSURE: 69 MMHG | SYSTOLIC BLOOD PRESSURE: 125 MMHG | TEMPERATURE: 97.1 F | BODY MASS INDEX: 35.27 KG/M2 | HEART RATE: 92 BPM | RESPIRATION RATE: 16 BRPM

## 2019-11-11 PROBLEM — N17.9 AKI (ACUTE KIDNEY INJURY) (HCC): Status: ACTIVE | Noted: 2019-11-11
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
## 2019-11-11 LAB
ALBUMIN SERPL BCP-MCNC: 3.6 G/DL (ref 3.2–4.9)
ALBUMIN/GLOB SERPL: 1.4 G/DL
ALP SERPL-CCNC: 152 U/L (ref 30–99)
ALT SERPL-CCNC: 20 U/L (ref 2–50)
ANION GAP SERPL CALC-SCNC: 9 MMOL/L (ref 0–11.9)
APPEARANCE UR: CLEAR
AST SERPL-CCNC: 22 U/L (ref 12–45)
BASOPHILS # BLD AUTO: 0.5 % (ref 0–1.8)
BASOPHILS # BLD: 0.04 K/UL (ref 0–0.12)
BILIRUB SERPL-MCNC: 0.6 MG/DL (ref 0.1–1.5)
BILIRUB UR QL STRIP.AUTO: NEGATIVE
BUN SERPL-MCNC: 27 MG/DL (ref 8–22)
CALCIUM SERPL-MCNC: 8.8 MG/DL (ref 8.5–10.5)
CHLORIDE SERPL-SCNC: 107 MMOL/L (ref 96–112)
CO2 SERPL-SCNC: 21 MMOL/L (ref 20–33)
COLOR UR: YELLOW
CREAT SERPL-MCNC: 1.14 MG/DL (ref 0.5–1.4)
EOSINOPHIL # BLD AUTO: 0.2 K/UL (ref 0–0.51)
EOSINOPHIL NFR BLD: 2.7 % (ref 0–6.9)
ERYTHROCYTE [DISTWIDTH] IN BLOOD BY AUTOMATED COUNT: 45.7 FL (ref 35.9–50)
ERYTHROCYTE [SEDIMENTATION RATE] IN BLOOD BY WESTERGREN METHOD: 11 MM/HOUR (ref 0–20)
GLOBULIN SER CALC-MCNC: 2.5 G/DL (ref 1.9–3.5)
GLUCOSE BLD-MCNC: 186 MG/DL (ref 65–99)
GLUCOSE BLD-MCNC: 192 MG/DL (ref 65–99)
GLUCOSE SERPL-MCNC: 239 MG/DL (ref 65–99)
GLUCOSE UR STRIP.AUTO-MCNC: >=1000 MG/DL
HCT VFR BLD AUTO: 45.7 % (ref 42–52)
HGB BLD-MCNC: 15.1 G/DL (ref 14–18)
IMM GRANULOCYTES # BLD AUTO: 0.05 K/UL (ref 0–0.11)
IMM GRANULOCYTES NFR BLD AUTO: 0.7 % (ref 0–0.9)
KETONES UR STRIP.AUTO-MCNC: NEGATIVE MG/DL
LEUKOCYTE ESTERASE UR QL STRIP.AUTO: NEGATIVE
LYMPHOCYTES # BLD AUTO: 2.24 K/UL (ref 1–4.8)
LYMPHOCYTES NFR BLD: 30.3 % (ref 22–41)
MCH RBC QN AUTO: 32.3 PG (ref 27–33)
MCHC RBC AUTO-ENTMCNC: 33 G/DL (ref 33.7–35.3)
MCV RBC AUTO: 97.6 FL (ref 81.4–97.8)
MICRO URNS: ABNORMAL
MONOCYTES # BLD AUTO: 0.79 K/UL (ref 0–0.85)
MONOCYTES NFR BLD AUTO: 10.7 % (ref 0–13.4)
NEUTROPHILS # BLD AUTO: 4.08 K/UL (ref 1.82–7.42)
NEUTROPHILS NFR BLD: 55.1 % (ref 44–72)
NITRITE UR QL STRIP.AUTO: NEGATIVE
NRBC # BLD AUTO: 0 K/UL
NRBC BLD-RTO: 0 /100 WBC
PH UR STRIP.AUTO: 5 [PH] (ref 5–8)
PLATELET # BLD AUTO: 150 K/UL (ref 164–446)
PMV BLD AUTO: 10 FL (ref 9–12.9)
POTASSIUM SERPL-SCNC: 3.8 MMOL/L (ref 3.6–5.5)
PROT SERPL-MCNC: 6.1 G/DL (ref 6–8.2)
PROT UR QL STRIP: NEGATIVE MG/DL
RBC # BLD AUTO: 4.68 M/UL (ref 4.7–6.1)
RBC UR QL AUTO: NEGATIVE
SODIUM SERPL-SCNC: 137 MMOL/L (ref 135–145)
SP GR UR STRIP.AUTO: 1.02
TSH SERPL DL<=0.005 MIU/L-ACNC: 1.45 UIU/ML (ref 0.38–5.33)
UROBILINOGEN UR STRIP.AUTO-MCNC: 0.2 MG/DL
VIT B12 SERPL-MCNC: 361 PG/ML (ref 211–911)
WBC # BLD AUTO: 7.4 K/UL (ref 4.8–10.8)

## 2019-11-11 PROCEDURE — 700102 HCHG RX REV CODE 250 W/ 637 OVERRIDE(OP): Performed by: HOSPITALIST

## 2019-11-11 PROCEDURE — A9270 NON-COVERED ITEM OR SERVICE: HCPCS | Performed by: HOSPITALIST

## 2019-11-11 PROCEDURE — 81003 URINALYSIS AUTO W/O SCOPE: CPT

## 2019-11-11 PROCEDURE — G0378 HOSPITAL OBSERVATION PER HR: HCPCS

## 2019-11-11 PROCEDURE — 82962 GLUCOSE BLOOD TEST: CPT

## 2019-11-11 PROCEDURE — 85025 COMPLETE CBC W/AUTO DIFF WBC: CPT

## 2019-11-11 PROCEDURE — 700105 HCHG RX REV CODE 258: Performed by: HOSPITALIST

## 2019-11-11 PROCEDURE — 80053 COMPREHEN METABOLIC PANEL: CPT

## 2019-11-11 PROCEDURE — 97161 PT EVAL LOW COMPLEX 20 MIN: CPT

## 2019-11-11 PROCEDURE — 96372 THER/PROPH/DIAG INJ SC/IM: CPT

## 2019-11-11 PROCEDURE — 36415 COLL VENOUS BLD VENIPUNCTURE: CPT

## 2019-11-11 PROCEDURE — 85652 RBC SED RATE AUTOMATED: CPT

## 2019-11-11 PROCEDURE — 82607 VITAMIN B-12: CPT

## 2019-11-11 PROCEDURE — 99217 PR OBSERVATION CARE DISCHARGE: CPT | Performed by: INTERNAL MEDICINE

## 2019-11-11 PROCEDURE — 84443 ASSAY THYROID STIM HORMONE: CPT

## 2019-11-11 RX ORDER — OMEPRAZOLE 20 MG/1
40 CAPSULE, DELAYED RELEASE ORAL
Qty: 30 CAP | Refills: 1 | Status: SHIPPED | OUTPATIENT
Start: 2019-11-11

## 2019-11-11 RX ADMIN — TAMSULOSIN HYDROCHLORIDE 0.4 MG: 0.4 CAPSULE ORAL at 10:00

## 2019-11-11 RX ADMIN — CITALOPRAM HYDROBROMIDE 20 MG: 20 TABLET ORAL at 06:31

## 2019-11-11 RX ADMIN — INSULIN HUMAN 1 UNITS: 100 INJECTION, SOLUTION PARENTERAL at 00:52

## 2019-11-11 RX ADMIN — OMEPRAZOLE 40 MG: 20 CAPSULE, DELAYED RELEASE ORAL at 06:31

## 2019-11-11 RX ADMIN — ASPIRIN 81 MG: 81 TABLET, COATED ORAL at 06:30

## 2019-11-11 RX ADMIN — APIXABAN 2.5 MG: 2.5 TABLET, FILM COATED ORAL at 00:47

## 2019-11-11 RX ADMIN — TRAMADOL HYDROCHLORIDE 50 MG: 50 TABLET, FILM COATED ORAL at 09:59

## 2019-11-11 RX ADMIN — INSULIN HUMAN 1 UNITS: 100 INJECTION, SOLUTION PARENTERAL at 06:32

## 2019-11-11 RX ADMIN — METOPROLOL SUCCINATE 25 MG: 25 TABLET, EXTENDED RELEASE ORAL at 06:31

## 2019-11-11 RX ADMIN — ATORVASTATIN CALCIUM 40 MG: 20 TABLET, FILM COATED ORAL at 00:47

## 2019-11-11 RX ADMIN — INSULIN GLARGINE 20 UNITS: 100 INJECTION, SOLUTION SUBCUTANEOUS at 00:52

## 2019-11-11 RX ADMIN — SODIUM CHLORIDE: 9 INJECTION, SOLUTION INTRAVENOUS at 00:48

## 2019-11-11 RX ADMIN — SENNOSIDES AND DOCUSATE SODIUM 2 TABLET: 8.6; 5 TABLET ORAL at 00:47

## 2019-11-11 RX ADMIN — TRAMADOL HYDROCHLORIDE 50 MG: 50 TABLET, FILM COATED ORAL at 03:37

## 2019-11-11 RX ADMIN — APIXABAN 2.5 MG: 2.5 TABLET, FILM COATED ORAL at 09:59

## 2019-11-11 RX ADMIN — FINASTERIDE 5 MG: 5 TABLET, FILM COATED ORAL at 06:30

## 2019-11-11 ASSESSMENT — ENCOUNTER SYMPTOMS
HEMOPTYSIS: 0
HALLUCINATIONS: 0
CHILLS: 0
FOCAL WEAKNESS: 0
BRUISES/BLEEDS EASILY: 0
SENSORY CHANGE: 0
SHORTNESS OF BREATH: 0
BLURRED VISION: 0
HEARTBURN: 0
COUGH: 0
SPEECH CHANGE: 0
EYE DISCHARGE: 0
DOUBLE VISION: 0
WEAKNESS: 1
VOMITING: 0
FLANK PAIN: 0
DIZZINESS: 0
ABDOMINAL PAIN: 0
MYALGIAS: 0
PALPITATIONS: 0
FALLS: 1
FEVER: 0
DEPRESSION: 0
NAUSEA: 0

## 2019-11-11 ASSESSMENT — GAIT ASSESSMENTS
ASSISTIVE DEVICE: FRONT WHEEL WALKER
DEVIATION: BRADYKINETIC;ANTALGIC
DISTANCE (FEET): 75
GAIT LEVEL OF ASSIST: STAND BY ASSIST

## 2019-11-11 ASSESSMENT — COGNITIVE AND FUNCTIONAL STATUS - GENERAL
MOBILITY SCORE: 22
SUGGESTED CMS G CODE MODIFIER MOBILITY: CJ
CLIMB 3 TO 5 STEPS WITH RAILING: A LITTLE
WALKING IN HOSPITAL ROOM: A LITTLE

## 2019-11-11 ASSESSMENT — COPD QUESTIONNAIRES
HAVE YOU SMOKED AT LEAST 100 CIGARETTES IN YOUR ENTIRE LIFE: YES
DURING THE PAST 4 WEEKS HOW MUCH DID YOU FEEL SHORT OF BREATH: SOME OF THE TIME
COPD SCREENING SCORE: 6
DO YOU EVER COUGH UP ANY MUCUS OR PHLEGM?: NO/ONLY WITH OCCASIONAL COLDS OR INFECTIONS

## 2019-11-11 ASSESSMENT — LIFESTYLE VARIABLES
EVER_SMOKED: YES
SUBSTANCE_ABUSE: 0

## 2019-11-11 NOTE — PROGRESS NOTES
Pt DC'd. IV removed, discharge instructions provided to patient, pt verbalizes understanding. Pt states all questions have been answered. Copy of discharge paperwork provided to pt, signed copy in chart. Pt states all belongings in possession. Pt awaiting transportation home with daughter.

## 2019-11-11 NOTE — PROGRESS NOTES
Admit from ED via niels a/o, accompanied by mireille,A/o,assessment completed,poc discussed,verbalized understanding,mild  Cachil DeHe, does not have hearing aid,ambulates with 1 person assist, bedside swallow completed, rates pain 7/10, Dr. Luis paged and notified,new orders received,bed alarm on for safety, pt hx of falls, most recent was 2 weeks ago,box meal given,call button within reach,will continue to monitor.

## 2019-11-11 NOTE — ED PROVIDER NOTES
ED Provider Note    CHIEF COMPLAINT  Chief Complaint   Patient presents with   • T-5000 FALL     2 weeks ago   • Hip Pain     R hip       HPI  Neil De Souza is a 79 y.o. male who presents with right hip and low back discomfort.  The patient had a fall 2 weeks ago.  He was evaluated at [facility] and had a negative work-up including imaging.  He was sent home.  The patient currently is living with his daughter who states the patient has had decreased activity will not get up and move much due to continued pain in the right hip.  They have noticed some generalized malaise and lethargy.  He does have a known history of diabetes myelitis but is controlled with insulin.  Family states they have a hard time controlling his blood sugars.  The patient states his pain is in the right hip and is worse with ambulation.  Describes it as sharp.  He has not had any trauma from the last 2 weeks that was described above.    REVIEW OF SYSTEMS  See HPI for further details. All other systems are negative.     PAST MEDICAL HISTORY  Past Medical History:   Diagnosis Date   • Chronic obstructive pulmonary disease (HCC)    • Congestive heart failure (HCC)    • Diabetes (HCC)        FAMILY HISTORY  [unfilled]    SOCIAL HISTORY  Social History     Socioeconomic History   • Marital status:      Spouse name: Not on file   • Number of children: Not on file   • Years of education: Not on file   • Highest education level: Not on file   Occupational History   • Not on file   Social Needs   • Financial resource strain: Not on file   • Food insecurity:     Worry: Not on file     Inability: Not on file   • Transportation needs:     Medical: Not on file     Non-medical: Not on file   Tobacco Use   • Smoking status: Former Smoker     Types: Pipe, Cigars   • Smokeless tobacco: Former User   Substance and Sexual Activity   • Alcohol use: No     Comment: hx alcohol abuse   • Drug use: No     Comment: navin   • Sexual activity: Not Currently  "  Lifestyle   • Physical activity:     Days per week: Not on file     Minutes per session: Not on file   • Stress: Not on file   Relationships   • Social connections:     Talks on phone: Not on file     Gets together: Not on file     Attends Ca[employer]ism service: Not on file     Active member of club or organization: Not on file     Attends meetings of clubs or organizations: Not on file     Relationship status: Not on file   • Intimate partner violence:     Fear of current or ex partner: Not on file     Emotionally abused: Not on file     Physically abused: Not on file     Forced sexual activity: Not on file   Other Topics Concern   • Not on file   Social History Narrative   • Not on file       SURGICAL HISTORY  Past Surgical History:   Procedure Laterality Date   • THROMBECTOMY Right 10/21/2018    Procedure: THROMBECTOMY;  Surgeon: Milagro Rodriguez M.D.;  Location: SURGERY [facility];  Service: General   • ANGIOGRAM Right 10/21/2018    Procedure: ANGIOGRAM;  Surgeon: Milagro Rodriguez M.D.;  Location: SURGERY [facility];  Service: General   • CHOLECYSTECTOMY         CURRENT MEDICATIONS  Home Medications    **Home medications have not yet been reviewed for this encounter**         ALLERGIES  Allergies   Allergen Reactions   • Gabapentin        PHYSICAL EXAM  VITAL SIGNS: /62   Pulse 71   Temp 36.6 °C (97.9 °F) (Temporal)   Resp (!) 23   Ht 1.753 m (5' 9\")   Wt 107.9 kg (237 lb 14 oz)   SpO2 96%   BMI 35.13 kg/m²  Room air O2: 95    Constitutional: Chronically ill in appearance  HENT: Normocephalic, Atraumatic, Bilateral external ears normal, Oropharynx dry, No oral exudates, Nose normal.   Eyes: PERRLA, EOMI, Conjunctiva normal, No discharge.   Neck: Normal range of motion, No tenderness, Supple, No stridor.   Lymphatic: No lymphadenopathy noted.   Cardiovascular: Normal heart rate, Normal rhythm, No murmurs, No rubs, No gallops.   Thorax & Lungs: Normal breath sounds, No respiratory distress, No " wheezing, No chest tenderness.   Abdomen: Bowel sounds normal, Soft, No tenderness, No masses, No pulsatile masses.   Skin: Warm, Dry, No erythema, No rash.   Back: No tenderness, No CVA tenderness.   Extremities: Pain throughout the lateral aspect of the right hip without obvious deformities.  No pelvic instability.  Extremities otherwise intact distal pulses, No edema, No tenderness, No cyanosis, No clubbing.   Neurologic: Alert & oriented x 3, Normal motor function, Normal sensory function, No focal deficits noted.   Psychiatric: Affect normal, Judgment normal, Mood normal.     RADIOLOGY/PROCEDURES  CT-LSPINE W/O PLUS RECONS   Final Result      No evidence of fracture or traumatic malalignment.      CT-HIP W/O PLUS RECONS RIGHT   Final Result      No acute bony findings.            COURSE & MEDICAL DECISION MAKING  Pertinent Labs & Imaging studies reviewed. (See chart for details)  This is an 79-year-old male who presents the emerge department with right hip discomfort.  CT scan of the right hip, pelvis, and lumbar sacral spine was obtained does not show any evidence of acute fractures.  I suspect his pain could be from a traumatic bursitis as he does have some tenderness to the lateral aspect of the right hip.  The patient also clinically appeared dehydrated on arrival and he did receive a liter of fluid.  His blood pressure has been stable but his laboratory analysis does show evidence of acute renal insufficiency with uncontrolled diabetes myelitis.  Therefore suspect he does have intravascular depletion and this could be causing some of his lethargy and malaise.  Family states they are unable to get the patient up and moving around at home.  Therefore admit the patient to the hospital for further intravenous hydration, blood sugar control, and physical therapy consultation to help the patient with his daily activities.  At the time of admission the patient continues be neurologically intact.    FINAL  IMPRESSION  1.  Right hip pain  2.  Uncontrolled diabetes myelitis  3.  Dehydration  4.  Renal insufficiency    Disposition  The patient will be admitted in stable condition         Electronically signed by: Neri Alcantar, 11/10/2019 9:38 PM

## 2019-11-11 NOTE — H&P
Hospital Medicine History & Physical Note    Date of Service  11/10/2019    Primary Care Physician  HAYDER Tomas    Consultants  none    Code Status  full    Chief Complaint  Weakness, falls     History of Presenting Illness  79 y.o. male who presented 11/10/2019 with past medical history of COPD, congestive heart failure, diabetes, paroxysmal atrial fibrillation on anticoagulation who presents with lower extremity weakness.  This patient has had multiple ground-level falls recently.  He feels like his legs give out underneath him.  And is complaining of some right hip and low back pain.  He has had decreased activity and has generalized malaise and lethargy recently.  Otherwise no known alleviating or exacerbating factors to her symptoms.  He will be admitted to the hospital for further evaluation as he appears somewhat dehydrated and he will need PT OT evaluation.    Review of Systems  Review of Systems   Constitutional: Positive for malaise/fatigue. Negative for chills and fever.   HENT: Negative for congestion, hearing loss and tinnitus.    Eyes: Negative for blurred vision, double vision and discharge.   Respiratory: Negative for cough, hemoptysis and shortness of breath.    Cardiovascular: Negative for chest pain, palpitations and leg swelling.   Gastrointestinal: Negative for abdominal pain, heartburn, nausea and vomiting.   Genitourinary: Negative for dysuria and flank pain.   Musculoskeletal: Positive for falls and joint pain. Negative for myalgias.   Skin: Negative for rash.   Neurological: Positive for weakness. Negative for dizziness, sensory change, speech change and focal weakness.   Endo/Heme/Allergies: Negative for environmental allergies. Does not bruise/bleed easily.   Psychiatric/Behavioral: Negative for depression, hallucinations and substance abuse.       Past Medical History   has a past medical history of Chronic obstructive pulmonary disease (HCC), Congestive heart failure (HCC),  and Diabetes (HCC).    Surgical History   has a past surgical history that includes thrombectomy (Right, 10/21/2018); angiogram (Right, 10/21/2018); and cholecystectomy.     Family History  family history includes Hypertension in his mother; No Known Problems in his father.     Social History   reports that he has quit smoking. His smoking use included pipe and cigars. He has quit using smokeless tobacco. He reports that he does not drink alcohol or use drugs.    Allergies  Allergies   Allergen Reactions   • Gabapentin        Medications  Prior to Admission Medications   Prescriptions Last Dose Informant Patient Reported? Taking?   HYDROcodone-acetaminophen (NORCO) 5-325 MG Tab per tablet   Yes No   Sig: Take 1-2 Tabs by mouth every four hours as needed.   Metoprolol Succinate 25 MG Capsule ER 24 Hour Sprinkle   Yes No   Sig: Take  by mouth.   acetaminophen (TYLENOL) 500 MG Tab   Yes No   Sig: Take 500-1,000 mg by mouth every 6 hours as needed.   albuterol (PROVENTIL) 2.5mg/0.5ml Nebu Soln   Yes No   Si.5 mg by Nebulization route every four hours as needed for Shortness of Breath.   apixaban (ELIQUIS) 5mg Tab   No No   Sig: Take 1 Tab by mouth 2 Times a Day.   aspirin EC (ECOTRIN) 81 MG Tablet Delayed Response   Yes No   Sig: Take 81 mg by mouth every day.   atorvastatin (LIPITOR) 40 MG Tab   Yes No   Sig: Take 40 mg by mouth every evening.   azithromycin (AZASITE) 1 % ophthalmic solution   No No   Sig: Place 1 Drop in both eyes every day.   citalopram (CELEXA) 20 MG Tab   Yes No   Sig: Take 20 mg by mouth every day.   digoxin (LANOXIN) 125 MCG Tab   Yes No   Sig: Take 125 mcg by mouth every morning.   docusate sodium 100 MG Cap   No No   Sig: Take 100 mg by mouth 2 Times a Day.   finasteride (PROSCAR) 5 MG Tab   Yes No   Sig: Take 5 mg by mouth every morning.   furosemide (LASIX) 40 MG Tab   Yes No   Sig: Take 40 mg by mouth every day.   insulin glargine (LANTUS SOLOSTAR) 100 UNIT/ML Solution Pen-injector  injection   Yes No   Sig: Inject 20 Units as instructed every evening.   insulin regular (HUMULIN R) 100 Unit/mL Solution   No No   Sig: Inject 2-9 Units as instructed 4 Times a Day,Before Meals and at Bedtime.   lisinopril (PRINIVIL) 20 MG Tab   No No   Sig: Take 1 Tab by mouth every day.   multivitamin (THERAGRAN) Tab   Yes No   Sig: Take 1 Tab by mouth every morning.   omeprazole (PRILOSEC) 20 MG delayed-release capsule   Yes No   Sig: Take 40 mg by mouth every morning before breakfast.   raNITidine (ZANTAC) 150 MG Tab   Yes No   Sig: Take 150 mg by mouth 2 times a day.   tamsulosin (FLOMAX) 0.4 MG capsule   Yes No   Sig: Take 0.4 mg by mouth ONE-HALF HOUR AFTER BREAKFAST.      Facility-Administered Medications: None       Physical Exam  Temp:  [36.6 °C (97.9 °F)] 36.6 °C (97.9 °F)  Pulse:  [] 61  Resp:  [13-25] 25  BP: (107-137)/() 117/56  SpO2:  [95 %-99 %] 99 %    Physical Exam  Vitals signs reviewed.   Constitutional:       General: He is not in acute distress.     Appearance: He is not ill-appearing.   HENT:      Head: Normocephalic and atraumatic.      Nose: No congestion.      Mouth/Throat:      Mouth: Mucous membranes are dry.   Eyes:      Extraocular Movements: Extraocular movements intact.      Pupils: Pupils are equal, round, and reactive to light.   Neck:      Musculoskeletal: Neck supple.   Cardiovascular:      Rate and Rhythm: Normal rate and regular rhythm.      Pulses: Normal pulses.      Heart sounds: Normal heart sounds.   Pulmonary:      Effort: Pulmonary effort is normal. No respiratory distress.      Breath sounds: Normal breath sounds. No wheezing.   Abdominal:      General: Bowel sounds are normal. There is no distension.      Palpations: Abdomen is soft.      Tenderness: There is no tenderness.   Musculoskeletal:         General: No swelling.   Skin:     General: Skin is warm and dry.      Capillary Refill: Capillary refill takes less than 2 seconds.   Neurological:       General: No focal deficit present.      Mental Status: He is alert and oriented to person, place, and time. Mental status is at baseline.   Psychiatric:         Mood and Affect: Mood normal.         Behavior: Behavior normal.         Thought Content: Thought content normal.         Judgment: Judgment normal.         Laboratory:  Recent Labs     11/10/19  1915   WBC 7.9   RBC 5.05   HEMOGLOBIN 16.4   HEMATOCRIT 49.4   MCV 97.8   MCH 32.5   MCHC 33.2*   RDW 45.7   PLATELETCT 184   MPV 10.0     Recent Labs     11/10/19  1915   SODIUM 134*   POTASSIUM 3.9   CHLORIDE 101   CO2 21   GLUCOSE 288*   BUN 30*   CREATININE 1.67*   CALCIUM 9.4     Recent Labs     11/10/19  1915   ALTSGPT 21   ASTSGOT 21   ALKPHOSPHAT 167*   TBILIRUBIN 0.7   GLUCOSE 288*     Recent Labs     11/10/19  1915   APTT 29.4   INR 0.95     No results for input(s): NTPROBNP in the last 72 hours.      No results for input(s): TROPONINT in the last 72 hours.    Urinalysis:    No results found     Imaging:  CT-LSPINE W/O PLUS RECONS   Final Result      No evidence of fracture or traumatic malalignment.      CT-HIP W/O PLUS RECONS RIGHT   Final Result      No acute bony findings.            Assessment/Plan:  I anticipate this patient is appropriate for observation status at this time.    * Falls  Assessment & Plan  Appears dehydrated, will cont with IVF for now   Reassess strength in the am   Check b12, tsh, ESR   Will need pt/ot evaluation   Patient is high risk due to being on anticoagulation and may have to reassess eliquis usage    ARNOLD (acute kidney injury) (HCC)  Assessment & Plan  Appears hypovolemic   Cont with IVF   Avoid nephrotoxic medications   Recheck labs in am   Consider urine lytes, renal utlrasound if no improvement     Chronic atrial fibrillation- (present on admission)  Assessment & Plan  Resume home BB and eliquis for now   Will need to reassess if eliquis is appropriate in his age with multiple falls     Chronic pain of both lower  extremities- (present on admission)  Assessment & Plan  Resume home tramadol     Benign prostatic hyperplasia with urinary hesitancy- (present on admission)  Assessment & Plan  Resume home proscar and flomax     Coronary artery disease involving native heart without angina pectoris- (present on admission)  Assessment & Plan  Resume home asa and bb     COPD (chronic obstructive pulmonary disease) ([facility])- (present on admission)  Assessment & Plan  Not in acute exacerbation   resp care protocol ordered    Type 2 diabetes mellitus with circulatory disorder (HCC)- (present on admission)  Assessment & Plan  SSI ordered accu checks     Essential hypertension- (present on admission)  Assessment & Plan  Resume home anti hypertensive medications       VTE prophylaxis: eliquis

## 2019-11-11 NOTE — THERAPY
"Physical Therapy Evaluation completed.   Bed Mobility:  Supine to Sit: Supervised  Transfers: Sit to Stand: Supervised  Gait: Level Of Assist: Stand by Assist with Front-Wheel Walker       Plan of Care: Patient with no further skilled PT needs in the acute care setting at this time  Discharge Recommendations: Equipment: No Equipment Needed. Post-acute therapy Discharge to home with outpatient or home health for additional skilled therapy services.    Pt is an 79 year old male admitted to the hospital for hip pain. Pt reports 2 falls over the past month resulting in R hip pain, however, imaging negative for fractures. Pt reports he lives with daughter and granddaughter who have been assisting more with care over the past month due to hip pain. At time of initial evaluation, pt performed bed mobility and transfers at SP level. Pt's B LE strength intact with testing. Pt does note intermittent tingling in B feet. Pt ambulated short distance in hallway with FWW, SBA. Pt with decreased juan and slightly antalgic gait, however, pt reports gait is at or near baseline. Pt had not overt LOB with ambulation. At this time, pt is at or near baseline mobility. Pt does not demonstrate the need for ongoing PT intervention while in the acute care setting.Recommend home health transitional care for continued physical therapy services.       See \"Rehab Therapy-Acute\" Patient Summary Report for complete documentation.     "

## 2019-11-11 NOTE — PROGRESS NOTES
Assumed pt care at 0700. Received report from Christine DYE. A&O x4. Pt denies pain at this time. Respirations even and unlabored on 3L n/c.    Updated on POC, communication board updated. Daughters at bedside. Bed locked and in lowest position. Call light and belongings within reach. Non-skid socks in place. Needs met, will continue to monitor.

## 2019-11-11 NOTE — ASSESSMENT & PLAN NOTE
Appears dehydrated, will cont with IVF for now   Reassess strength in the am   Check b12, tsh, ESR   Will need pt/ot evaluation   Patient is high risk due to being on anticoagulation and may have to reassess eliquis usage

## 2019-11-11 NOTE — ASSESSMENT & PLAN NOTE
Resume home BB and eliquis for now   Will need to reassess if eliquis is appropriate in his age with multiple falls

## 2019-11-11 NOTE — DISCHARGE PLANNING
CM met with pt and daughter Veronica at bedside. Pt lives with daughter Polly in [place]. He has FFW, WC, BSC and Shower chair. He has received HH through [facility] in the past and wishes to continue with them. He will be staying in [place] with Veronica until Nov 19-20 as he has cortisone injection in shoulder scheduled this week and Polly is having surgery next week. Choice for [facility] faxed to [facility] with instructions to contact Polly directly to schedule.     Care Transition Team Assessment    Information Source  Orientation : Oriented x 4  Information Given By: Patient         Elopement Risk  Legal Hold: No  Ambulatory or Self Mobile in Wheelchair: Yes  Disoriented: No  Psychiatric Symptoms: None  History of Wandering: No  Elopement this Admit: No  Vocalizing Wanting to Leave: No  Displays Behaviors, Body Language Wanting to Leave: No-Not at Risk for Elopement  Elopement Risk: Not at Risk for Elopement    Interdisciplinary Discharge Planning  Does Admitting Nurse Feel This Could be a Complex Discharge?: No  Lives with - Patient's Self Care Capacity: Adult Children  Patient or legal guardian wants to designate a caregiver (see row info): Yes  Caregiver name: POLLY CAPELLAN  Caregiver relationship to patient: DAUGHTER  Caregiver contact info: VERONICA -DAUGHTER [phone]  Housing / Facility: [address]  Do You Take your Prescribed Medications Regularly: Yes  Able to Return to Previous ADL's: Future Time w/Therapy  Mobility Issues: Yes  Prior Services: Intermittent Physical Support for ADL Per Family  Patient Expects to be Discharged to:: home  Assistance Needed: Yes  Durable Medical Equipment: Walker, Commode    Discharge Preparedness  What is your plan after discharge?: Home with help  What are your discharge supports?: Child  Prior Functional Level: Needs Assist with Activities of Daily Living  Difficulity with ADLs: Walking    Functional Assesment  Prior Functional Level: Needs Assist with Activities of Daily  Living    Finances  Financial Barriers to Discharge: No  Prescription Coverage: Yes    Vision / Hearing Impairment  Vision Impairment : Yes  Right Eye Vision: Wears Glasses  Left Eye Vision: Wears Glasses  Hearing Impairment : Yes  Hearing Impairment: Both Ears, Hearing Device Not Available              Domestic Abuse  Have you ever been the victim of abuse or violence?: No  Physical Abuse or Sexual Abuse: No  Verbal Abuse or Emotional Abuse: No  Possible Abuse Reported to:: Not Applicable         Discharge Risks or Barriers  Discharge risks or barriers?: No    Anticipated Discharge Information  Anticipated discharge disposition: Home  Discharge Address: [address] [place], [place] [address] [place] [id number]  Discharge Contact Phone Number: [phone]

## 2019-11-11 NOTE — RESPIRATORY CARE
COPD EDUCATION by COPD CLINICAL EDUCATOR  11/11/2019 at 10:13 AM by Oneyda Wilkes     Patient reviewed by COPD education team. Patient refuses COPD education at this time. Quit smoking 1978.

## 2019-11-11 NOTE — ED NOTES
Med rec updated and complete. Allergies reviewed. Met with pt/familyat bedside. Updated med rec based on interview with daughter at bedside. Daughter had pictures of pts current medications. No antibiotic use in last 14 days.  Local home pharmacy [facility].  [facility] pharmacy [facility].

## 2019-11-11 NOTE — ASSESSMENT & PLAN NOTE
Appears hypovolemic   Cont with IVF   Avoid nephrotoxic medications   Recheck labs in am   Consider urine lytes, renal utlrasound if no improvement

## 2019-11-11 NOTE — ED TRIAGE NOTES
Wheeled to triage  Chief Complaint   Patient presents with   • T-5000 FALL     2 weeks ago   • Hip Pain     R hip     Pt said 2 weeks ago, his dog tripped him in his house.  He hit his R rip on either the piano or the couch and has had increased pain since then.  Saw his PCP right after it happened, and was told it's just bruised.

## 2019-11-11 NOTE — DISCHARGE INSTRUCTIONS
Discharge Instructions per JESSICA Charles    Please continue with outpatient physical therapy and occupational therapy 5 times per week for 5 weeks, or as instructed by your physical therapist.     FOLLOW-UP: Please follow-up with your Primary Care Provider in 1 week.     DIET: Cardiac diet    ACTIVITY: As tolerated     DIAGNOSIS: Dehydration     Discharge Instructions    Discharged to home by car with relative. Discharged via wheelchair, hospital escort: Yes.  Special equipment needed: Not Applicable    Be sure to schedule a follow-up appointment with your primary care doctor or any specialists as instructed.     Discharge Plan:   Diet Plan: Discussed  Activity Level: Discussed  Confirmed Follow up Appointment: Appointment Scheduled  Confirmed Symptoms Management: Discussed  Medication Reconciliation Updated: Yes  Influenza Vaccine Indication: Patient Refuses    I understand that a diet low in cholesterol, fat, and sodium is recommended for good health. Unless I have been given specific instructions below for another diet, I accept this instruction as my diet prescription.   Other diet: Heart healthy    Special Instructions: None    · Is patient discharged on Warfarin / Coumadin?   No     Depression / Suicide Risk    As you are discharged from this [facility] facility, it is important to learn how to keep safe from harming yourself.    Recognize the warning signs:  · Abrupt changes in personality, positive or negative- including increase in energy   · Giving away possessions  · Change in eating patterns- significant weight changes-  positive or negative  · Change in sleeping patterns- unable to sleep or sleeping all the time   · Unwillingness or inability to communicate  · Depression  · Unusual sadness, discouragement and loneliness  · Talk of wanting to die  · Neglect of personal appearance   · Rebelliousness- reckless behavior  · Withdrawal from people/activities they love  · Confusion-  inability to concentrate     If you or a loved one observes any of these behaviors or has concerns about self-harm, here's what you can do:  · Talk about it- your feelings and reasons for harming yourself  · Remove any means that you might use to hurt yourself (examples: pills, rope, extension cords, firearm)  · Get professional help from the community (Mental Health, Substance Abuse, psychological counseling)  · Do not be alone:Call your Safe Contact- someone whom you trust who will be there for you.  · Call your local CRISIS HOTLINE 079-2078 or 596-425-5954  · Call your local Children's Mobile Crisis Response Team [place] (943) 742-7552 or www.[employer]  · Call the toll free National Suicide Prevention Hotlines   · National Suicide Prevention Lifeline 304-443-GFKB (4193)  · [employer] Line Network 800-SUICIDE (616-8586)        Dehydration  Dehydration is when there is not enough fluid or water in your body. This happens when you lose more fluids than you take in. People who are age 65 or older have a higher risk of getting dehydrated.  Dehydration can range from mild to very bad. It should be treated right away to keep it from getting very bad.  Symptoms of mild dehydration may include:  · Thirst.  · Dry lips.  · Slightly dry mouth.  · Dry, warm skin.  · Dizziness.  Symptoms of moderate dehydration may include:  · Very dry mouth.  · Muscle cramps.  · Dark pee (urine). Pee may be the color of tea.  · Your body making less pee.  · Your eyes making fewer tears.  · Heartbeat that is uneven or faster than normal (palpitations).  · Headache.  · Light-headedness, especially when you stand up from sitting.  · Fainting (syncope).  Symptoms of very bad dehydration may include:  · Changes in skin, such as:  ¨ Cold and clammy skin.  ¨ Blotchy (mottled) or pale skin.  ¨ Skin that does not quickly return to normal after being lightly pinched and let go (poor skin turgor).  · Changes in body fluids, such  as:  ¨ Feeling very thirsty.  ¨ Your eyes making fewer tears.  ¨ Not sweating when body temperature is high, such as in hot weather.  ¨ Your body making very little pee.  · Changes in vital signs, such as:  ¨ Weak pulse.  ¨ Pulse that is more than 100 beats a minute when you are sitting still.  ¨ Fast breathing.  ¨ Low blood pressure.  · Other changes, such as:  ¨ Sunken eyes.  ¨ Cold hands and feet.  ¨ Confusion.  ¨ Lack of energy (lethargy).  ¨ Trouble waking up from sleep.  ¨ Short-term weight loss.  ¨ Unconsciousness.  Follow these instructions at home:  · If told by your doctor, drink an ORS:  ¨ Make an ORS by using instructions on the package.  ¨ Start by drinking small amounts, about ½ cup (120 mL) every 5-10 minutes.  ¨ Slowly drink more until you have had the amount that your doctor said to have.  · Drink enough clear fluid to keep your pee clear or pale yellow. If you were told to drink an ORS, finish the ORS first, then start slowly drinking clear fluids. Drink fluids such as:  ¨ Water. Do not drink only water by itself. Doing that can make the salt (sodium) level in your body get too low (hyponatremia).  ¨ Ice chips.  ¨ Fruit juice that you have added water to (diluted).  ¨ Low-calorie sports drinks.  · Avoid:  ¨ Alcohol.  ¨ Drinks that have a lot of sugar. These include high-calorie sports drinks, fruit juice that does not have water added, and soda.  ¨ Caffeine.  ¨ Foods that are greasy or have a lot of fat or sugar.  · Take over-the-counter and prescription medicines only as told by your doctor.  · Do not take salt tablets. Doing that can make the salt level in your body get too high (hypernatremia).  · Eat foods that have minerals (electrolytes). Examples include bananas, oranges, potatoes, tomatoes, and spinach.  · Keep all follow-up visits as told by your doctor. This is important.  Contact a doctor if:  · You have belly (abdominal) pain that:  ¨ Gets worse.  ¨ Stays in one area (localizes).  · You  have a rash.  · You have a stiff neck.  · You get angry or annoyed more easily than normal (irritability).  · You are more sleepy than normal.  · You have a harder time waking up than normal.  · You feel:  ¨ Weak.  ¨ Dizzy.  ¨ Very thirsty.  Get help right away if:  · You have symptoms of very bad dehydration.  · You cannot drink fluids without throwing up (vomiting).  · Your symptoms get worse with treatment.  · You have a fever.  · You have a very bad headache.  · You are throwing up or having watery poop (diarrhea) and it:  ¨ Gets worse.  ¨ Does not go away.  · You have diarrhea for more than 24 hours.  · You have blood or something green (bile) in your throw-up.  · You have blood in your poop (stool). This may cause poop to look black and tarry.  · You have not peed in 6-8 hours.  · You have peed (urinated) only a small amount of very dark pee during 6-8 hours.  · You pass out (faint).  · Your heart rate when you are sitting still is more than 100 beats a minute.  · You have trouble breathing.  This information is not intended to replace advice given to you by your health care provider. Make sure you discuss any questions you have with your health care provider.  Document Released: 12/06/2012 Document Revised: 07/07/2017 Document Reviewed: 02/10/2017  [employer] Patient Education © 2017 [employer]

## 2019-11-11 NOTE — DISCHARGE SUMMARY
Discharge Summary    CHIEF COMPLAINT ON ADMISSION  Chief Complaint   Patient presents with   • T-5000 FALL     2 weeks ago   • Hip Pain     R hip       Reason for Admission  R Hip Pain     Admission Date  11/10/2019    CODE STATUS  Full Code    HPI & HOSPITAL COURSE  Neil De Souza is a 79 y.o. male who presented 11/10/2019 with past medical history of COPD, congestive heart failure, diabetes, paroxysmal atrial fibrillation on anticoagulation who presents with lower extremity weakness.  This patient has had multiple ground-level falls recently.  He feels like his legs give out underneath him.  And is complaining of some right hip and low back pain.  He has had decreased activity and has generalized malaise and lethargy recently. Otherwise no known alleviating or exacerbating factors to her symptoms. He appeared dehydrated on presentation. For this reason, he was admitted to the CDU for IVF and further management. He remained stable overnight. No acute events or new complaints. CT of hip and spine negative for acute trauma. He was seen by PT prior to discharge, who recommended outpatient PT. Once pain was well controlled, patient was given prescription for outpatient PT/OT and discharged w/ instructions to follow-up with PCP in 1 week.        Therefore, he is discharged in good and stable condition to home with close outpatient follow-up.    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 *      FOLLOW UP  Future Appointments   Date Time Provider Department Center   11/11/2019 10:40 AM [facility]AY[facility] T[id number]  [address]  [address]  [place] [address]  [phone]    Schedule an appointment as soon as possible for a visit in 1 week  Post-hospitalization management      MEDICATIONS ON DISCHARGE     Medication List      CONTINUE taking these medications      Instructions   aspirin EC 81 MG Tbec  Commonly known as:  ECOTRIN   Take 81 mg by mouth every day.  Dose:  81 mg     atorvastatin 40 MG Tabs  Commonly known as:  LIPITOR   Take 40 mg by mouth every evening.  Dose:  40 mg     citalopram 20 MG Tabs  Commonly known as:  CELEXA   Take 20 mg by mouth every day.  Dose:  20 mg     docusate sodium 100 MG Caps   Take 100 mg by mouth 2 Times a Day.  Dose:  100 mg     ELIQUIS 5mg Tabs  Generic drug:  apixaban   Take 5 mg by mouth 2 Times a Day.  Dose:  5 mg     finasteride 5 MG Tabs  Commonly known as:  PROSCAR   Take 5 mg by mouth every morning.  Dose:  5 mg     furosemide 40 MG Tabs  Commonly known as:  LASIX   Take 40 mg by mouth every day.  Dose:  40 mg     glimepiride 4 MG Tabs  Commonly known as:  AMARYL   Take 4 mg by mouth 2 Times a Day.  Dose:  4 mg     HUMALOG 100 UNIT/ML  Generic drug:  insulin lispro   Inject 2-5 Units as instructed 3 times a day, with meals. 151-200 = 2 units  201-250 = 4 units  251-300 = 5 units  Dose:  2-5 Units     LANTUS SOLOSTAR 100 UNIT/ML Sopn injection  Generic drug:  insulin glargine   Inject 20 Units as instructed every evening.  Dose:  20 Units     lisinopril 20 MG Tabs  Commonly known as:  PRINIVIL   Take 20 mg by mouth every day.  Dose:  20 mg     metoprolol SR 25 MG Tb24  Commonly known as:  TOPROL XL   Take 25 mg by mouth every day.  Dose:  25 mg     multivitamin Tabs   Take 1 Tab by mouth every morning.  Dose:  1 Tab      omeprazole 20 MG delayed-release capsule  Commonly known as:  PRILOSEC   Take 2 Caps by mouth every morning before breakfast.  Dose:  40 mg     pantoprazole 40 MG Tbec  Commonly known as:  PROTONIX   Take 40 mg by mouth every day.  Dose:  40 mg     tamsulosin 0.4 MG capsule  Commonly known as:  FLOMAX   Take 0.4 mg by mouth ONE-HALF HOUR AFTER BREAKFAST.  Dose:  0.4 mg     tramadol 50 MG Tabs  Commonly known as:  ULTRAM   Take 50 mg by mouth 3 times a day as needed.  Dose:  50 mg            Allergies  Allergies   Allergen Reactions   • Gabapentin        DIET  Orders Placed This Encounter   Procedures   • Diet Order Diabetic     Standing Status:   Standing     Number of Occurrences:   1     Order Specific Question:   Diet:     Answer:   Diabetic [3]       ACTIVITY  As tolerated.  Weight bearing as tolerated    CONSULTATIONS  None     PROCEDURES  None     LABORATORY  Lab Results   Component Value Date    SODIUM 137 11/11/2019    POTASSIUM 3.8 11/11/2019    CHLORIDE 107 11/11/2019    CO2 21 11/11/2019    GLUCOSE 239 (H) 11/11/2019    BUN 27 (H) 11/11/2019    CREATININE 1.14 11/11/2019        Lab Results   Component Value Date    WBC 7.4 11/11/2019    HEMOGLOBIN 15.1 11/11/2019    HEMATOCRIT 45.7 11/11/2019    PLATELETCT 150 (L) 11/11/2019

## 2019-11-12 ENCOUNTER — PATIENT OUTREACH (OUTPATIENT)
Dept: MEDICAL GROUP | Facility: MEDICAL CENTER | Age: 79
End: 2019-11-12

## 2019-11-12 NOTE — PROGRESS NOTES
11/12/19 8:53am:  [facility] post discharge outreach call first attempt. Pt's daughter answered telephone. Reports he is not available. States he may be available at number provided [phone]. CM attempted to reach patient at this number. No answer. CM unable to complete post discharge call.

## 2019-11-14 ENCOUNTER — OFFICE VISIT (OUTPATIENT)
Dept: MEDICAL GROUP | Facility: MEDICAL CENTER | Age: 79
End: 2019-11-14
Payer: MEDICARE

## 2019-11-14 VITALS
OXYGEN SATURATION: 96 % | BODY MASS INDEX: 35.55 KG/M2 | TEMPERATURE: 98.3 F | HEIGHT: 69 IN | DIASTOLIC BLOOD PRESSURE: 58 MMHG | SYSTOLIC BLOOD PRESSURE: 138 MMHG | HEART RATE: 90 BPM | WEIGHT: 240 LBS | RESPIRATION RATE: 20 BRPM

## 2019-11-14 DIAGNOSIS — J96.11 CHRONIC RESPIRATORY FAILURE WITH HYPOXIA (HCC): ICD-10-CM

## 2019-11-14 DIAGNOSIS — E11.59 TYPE 2 DIABETES MELLITUS WITH OTHER CIRCULATORY COMPLICATION, WITH LONG-TERM CURRENT USE OF INSULIN (HCC): ICD-10-CM

## 2019-11-14 DIAGNOSIS — Z79.4 TYPE 2 DIABETES MELLITUS WITH OTHER CIRCULATORY COMPLICATION, WITH LONG-TERM CURRENT USE OF INSULIN (HCC): ICD-10-CM

## 2019-11-14 DIAGNOSIS — Z09 HOSPITAL DISCHARGE FOLLOW-UP: ICD-10-CM

## 2019-11-14 DIAGNOSIS — R29.898 WEAKNESS OF BOTH LOWER EXTREMITIES: ICD-10-CM

## 2019-11-14 DIAGNOSIS — Z79.01 CHRONIC ANTICOAGULATION: ICD-10-CM

## 2019-11-14 DIAGNOSIS — R29.6 RECURRENT FALLS: ICD-10-CM

## 2019-11-14 DIAGNOSIS — I48.20 CHRONIC ATRIAL FIBRILLATION (HCC): ICD-10-CM

## 2019-11-14 PROBLEM — N17.9 AKI (ACUTE KIDNEY INJURY) (HCC): Status: RESOLVED | Noted: 2019-11-11 | Resolved: 2019-11-14

## 2019-11-14 PROCEDURE — 99496 TRANSJ CARE MGMT HIGH F2F 7D: CPT | Performed by: FAMILY MEDICINE

## 2019-11-14 NOTE — PROGRESS NOTES
Subjective:     Neil De Souza is a 79 y.o. male who presents for Hospital Follow-up.  Chart and discharge summary reviewed.   Date of discharge 11/11/2019.  48- hour post discharge RN call completed on 11/13/2019 and documented in the medical record by Jeni Capps RN:  POST DISCHARGE CALL:  Discharge Date:11/11/2019   Date of Outreach Call: 11/13/2019 10:04 AM  Now that you're home, how are you doing? Fair  Comment:CM spoke to pt and dtr. Pt reports he is still  having pain in his r. hip. Has appt this week with Dr. Segundo.  Do you have questions about your medications? No  Comment:Dtr states pt is also taking Methocarbamol 750mg  two times daily. Reports this was prescribed by the provider  in [place].    Did you fill your medications? Yes    Do you have a follow-up appointment scheduled?Yes  Comment:Post discharge clinic 11/14/19    Discharging Department: Clinical Decision Unit    Number of Attempts: 1  Current or previous attempts completed within two business days of discharge? Yes  Provided education regarding treatment plan, medication, self-management, ADLs? Yes  Comment:Encouraged to bring log of blood sugar readings  to appt. Dtr reports today was 270.Pt on sliding scale  coverage with humalog.  Has patient completed Advance Directive? If yes, advise them to bring to appointment. No  Care Manager phone number provided? Yes  Is there anything else I can help you with? No         HPI: The patient is an 79-year-old white male with a history of COPD, coronary artery disease, chronic atrial fibrillation on anticoagulation, poorly controlled type 2 diabetes mellitus, PAD, hypertension, chronic pain of both lower extremities.  He was recently hospitalized with lower extremity weakness.  He has had multiple ground-level falls.  He says his legs give out from under him.  He was noted to have acute kidney injury which resolved with IV fluids.  CT scans of the lumbar spine and right hip showed no fractures or  other acute process.  He is seeing Dr. luciano Kaur later this afternoon for continuing pain in his right hip.  The patient was discharged with orders for PT and OT to be taken to a local hospital near where he lives.  The family prefers that over home health care because of more opportunities to use equipment.  The patient lives in [place] with one daughter.  He is here today with another daughter who lives in [place].  Patient is on oxygen chronically at 3 L.    The patient's daughter brought in his insulin but none of the other medications.  His med list states that he is on Lantus 20 units in the evening and Humalog 3 to 5 units AC on a sliding scale.  The daughter showed me that he is taking NPH KwikPen on a sliding scale and Humalog 10 units twice daily.  He got the most recent prescription from a local doctor in [place].  His blood sugars typically run high in the 200s and sometimes in the 300s.  She did not bring a log of the blood sugar readings.  Last hemoglobin A1c in our system shows high at 9.8 on 3/19/2019.      Since returning home, patient reports feeling tired.     The patient has questions regarding hospitalization or discharge: The patient himself had no questions  The patient has weakness; has difficulty taking care of self at home.  Unfortunately we are not able to verify all of his other medications.    Current Outpatient Medications   Medication Sig Dispense Refill   • glimepiride (AMARYL) 4 MG Tab Take 4 mg by mouth 2 Times a Day.  2   • HUMALOG 100 UNIT/ML Inject 2-5 Units as instructed 3 times a day, with meals. 151-200 = 2 units  201-250 = 4 units  251-300 = 5 units  5   • pantoprazole (PROTONIX) 40 MG Tablet Delayed Response Take 40 mg by mouth every day.  1   • tramadol (ULTRAM) 50 MG Tab Take 50 mg by mouth 3 times a day as needed.  0   • apixaban (ELIQUIS) 5mg Tab Take 5 mg by mouth 2 Times a Day.     • lisinopril (PRINIVIL) 20 MG Tab Take 20 mg by mouth every day.     • metoprolol SR (TOPROL XL)  25 MG TABLET SR 24 HR Take 25 mg by mouth every day.     • aspirin EC (ECOTRIN) 81 MG Tablet Delayed Response Take 81 mg by mouth every day.     • furosemide (LASIX) 40 MG Tab Take 40 mg by mouth every day.     • citalopram (CELEXA) 20 MG Tab Take 20 mg by mouth every day.     • tamsulosin (FLOMAX) 0.4 MG capsule Take 0.4 mg by mouth ONE-HALF HOUR AFTER BREAKFAST.     • atorvastatin (LIPITOR) 40 MG Tab Take 40 mg by mouth every evening.     • docusate sodium 100 MG Cap Take 100 mg by mouth 2 Times a Day. 60 Cap    • finasteride (PROSCAR) 5 MG Tab Take 5 mg by mouth every morning.     • insulin glargine (LANTUS SOLOSTAR) 100 UNIT/ML Solution Pen-injector injection Inject 20 Units as instructed every evening.     • multivitamin (THERAGRAN) Tab Take 1 Tab by mouth every morning.     • omeprazole (PRILOSEC) 20 MG delayed-release capsule Take 2 Caps by mouth every morning before breakfast. (Patient not taking: Reported on 11/14/2019) 30 Cap 1     No current facility-administered medications for this visit.        Patient Active Problem List    Diagnosis Date Noted   • Falls 11/11/2019   • ARNOLD (acute kidney injury) ([facility]) 11/11/2019   • Chronic atrial fibrillation 04/17/2019   • Chronic pain of both lower extremities 03/18/2019   • Benign prostatic hyperplasia with urinary hesitancy 12/17/2018   • Essential hypertension 10/21/2018   • Type 2 diabetes mellitus with circulatory disorder ([facility]) 10/21/2018   • COPD (chronic obstructive pulmonary disease) ([facility]) 10/21/2018   • Coronary artery disease involving native heart without angina pectoris 10/21/2018       Allergies:   Gabapentin    Social History:  Social History     Tobacco Use   • Smoking status: Former Smoker     Types: Pipe, Cigars   • Smokeless tobacco: Former User   Substance Use Topics   • Alcohol use: No     Comment: hx alcohol abuse        Family History:  Family History   Problem Relation Age of Onset   • Hypertension Mother    • No Known Problems Father   "      Past Medical History:   Diagnosis Date   • Chronic obstructive pulmonary disease (HCC)    • Congestive heart failure (HCC)    • Diabetes (HCC)        Surgical History  Past Surgical History:   Procedure Laterality Date   • THROMBECTOMY Right 10/21/2018    Procedure: THROMBECTOMY;  Surgeon: Milagro Rodriguez M.D.;  Location: SURGERY [facility];  Service: General   • ANGIOGRAM Right 10/21/2018    Procedure: ANGIOGRAM;  Surgeon: Milagro Rodriguez M.D.;  Location: SURGERY [facility];  Service: General   • CHOLECYSTECTOMY         ROS:    Constitutional:  Denies fever, chills, night sweats  HENT: Denies head congestion, ear pain or drainage, sore throat  Eyes: Denies visual changes, eye drainage or redness, eye pain  Neck: Denies pain, swollen glands, decreased range of motion  Lungs: Denies shortness of breath, wheezing, cough  Cardiovascular: Denies chest pain, orthopnea, lower extremity edema, palpitations  Abdominal: Denies abdominal pain, change in bowel or bladder habits, nausea or vomiting  Musculoskeletal: He has chronic pain in his lower extremities, right hip, left shoulder.  Endocrine: Denies unexplained weight changes, heat or cold intolerance, hair loss, polyuria or polydipsia  Neurological: Denies dizziness, headache, confusion, focal weakness or numbness, memory loss  Psychiatric: He takes Celexa for depression.       Objective:     /58 (BP Location: Right arm, Patient Position: Sitting)   Pulse 90   Temp 36.8 °C (98.3 °F) (Temporal)   Resp 20   Ht 1.753 m (5' 9\")   Wt 108.9 kg (240 lb)   SpO2 96%  (3 L O2 nasal cannula)    Physical Exam:    GEN:  Alert, oriented, in no distress  HEENT:  PERRLA, EOMI  LUNGS:  Clear to auscultation without rales, rhonci, or wheezes.  CV:  Heart irregularly irregular rhythm without murmurs or S3 or S4  EXT:  Without edema.  NEURO:  Cranial nerves II through XII intact.  Motor function and sensation intact.  SKIN: No rashes or suspicious " lesions.  PSYCH:  Behavior is appropriate.      Assessment and Plan:     1. Hospital follow-up:   Hospitalization and results reviewed with patient. High risk conditions requiring teaching or care coordination were identified and addressed.The patient demonstrate understanding of admission and underlying conditions. The patient understands discharge instructions and when to seek medical attention. Medications reviewed including instructions regarding high risk medications, dosing and side effects.    The patient is able to safely adhere to ADL/IADL, treatment and medication regimen, self-manage of high-risk conditions? No   The patient requires physical therapy/home health/DME referral? Yes, PT/OT already ordered  The patient requires referral to care coordination/behavioral health/social work?  No   Patient requires referral for pharmacy consult? No   Advance directive/POLST on file?  No   Required counseling on advance directive?  Deferred    2. Weakness of both lower extremities  -Probably multifactorial including deconditioned state, PAD.  PCP ordered lower extremity arterial ultrasound back in March but he never got this done.    3. Recurrent falls  -Multifactorial as noted in #2.    4. Type 2 diabetes mellitus with other circulatory complication, with long-term current use of insulin (HCC)  -I counseled the daughter at length regarding the appropriate way to use the prescribed insulin.  He will continue the NPH instead of the Lantus since that is what he has available.  She will reverse the way it is being administered: She will give the NPH 10 units twice daily and the Humalog on a sliding scale which she has on her phone.  I checked it and it is appropriate.    5. Chronic respiratory failure with hypoxia (HCC)  -Continue oxygen 3 L per nasal cannula 24/7    6. Chronic atrial fibrillation  -Continue Eliquis and cardiology follow-up.    7. Chronic anticoagulation  -Continue Eliquis.      Medication  Reconciliation  Medication list at end of encounter:   Current Outpatient Medications   Medication Sig Dispense Refill   • glimepiride (AMARYL) 4 MG Tab Take 4 mg by mouth 2 Times a Day.  2   • HUMALOG 100 UNIT/ML Inject 2-5 Units as instructed 3 times a day, with meals. 151-200 = 2 units  201-250 = 4 units  251-300 = 5 units  5   • pantoprazole (PROTONIX) 40 MG Tablet Delayed Response Take 40 mg by mouth every day.  1   • tramadol (ULTRAM) 50 MG Tab Take 50 mg by mouth 3 times a day as needed.  0   • apixaban (ELIQUIS) 5mg Tab Take 5 mg by mouth 2 Times a Day.     • lisinopril (PRINIVIL) 20 MG Tab Take 20 mg by mouth every day.     • metoprolol SR (TOPROL XL) 25 MG TABLET SR 24 HR Take 25 mg by mouth every day.     • aspirin EC (ECOTRIN) 81 MG Tablet Delayed Response Take 81 mg by mouth every day.     • furosemide (LASIX) 40 MG Tab Take 40 mg by mouth every day.     • citalopram (CELEXA) 20 MG Tab Take 20 mg by mouth every day.     • tamsulosin (FLOMAX) 0.4 MG capsule Take 0.4 mg by mouth ONE-HALF HOUR AFTER BREAKFAST.     • atorvastatin (LIPITOR) 40 MG Tab Take 40 mg by mouth every evening.     • docusate sodium 100 MG Cap Take 100 mg by mouth 2 Times a Day. 60 Cap    • finasteride (PROSCAR) 5 MG Tab Take 5 mg by mouth every morning.     • insulin glargine (LANTUS SOLOSTAR) 100 UNIT/ML Solution Pen-injector injection Inject 20 Units as instructed every evening.     • multivitamin (THERAGRAN) Tab Take 1 Tab by mouth every morning.     • omeprazole (PRILOSEC) 20 MG delayed-release capsule Take 2 Caps by mouth every morning before breakfast. (Patient not taking: Reported on 11/14/2019) 30 Cap 1     No current facility-administered medications for this visit.        Primary care follow-up:  New health conditions identified during hospitalization? No   Labs/pathology/imaging requires future PCP follow-up?  Yes, needs HgA1C  Changes to medications during hospitalization or today? Yes, see notes on  insulin.    Recommended followup:  with HAYDER Tomas in the next couple of weeks.  We tried to schedule this but the patient's daughter here in [place] needs to coordinate with her sister in [place] which she was not able to do at the time of our visit.  I instructed her to have her sister bring in all of the patient's medication to the next visit so that these can be reconciled.      Patient Instruction  Patient provided with educational material on discharge diagnosis and management of symptoms/red flags. Patient instructed to keep follow-up appointments and to bring written questions and and actual medications to each office visit. Patient instructed to call PCP/specialist with any problems/questions/concerns. Patient verbalizes understanding and has no further questions at this time.    Face-to-face transitional care management services with high medical decision complexity were provided.

## 2020-12-08 NOTE — PROGRESS NOTES
[facility] Hospitalist Progress Note    Date of Service: 10/23/2018    Chief Complaint  78 y.o. male admitted 10/21/2018 with right lower extremity ischemia status post thrombectomy, history of paroxysmal atrial fibrillation on chronic anticoagulation with Coumadin.    Interval Problem Update  Max assist with therapy, poor activity tolerance  Nausea after PT  Denies LE pain    Consultants/Specialty  Vascular surgery    Disposition  TBD   snf referral        Review of Systems   Constitutional: Negative for chills, fever and malaise/fatigue.   HENT: Negative for congestion.    Respiratory: Negative for shortness of breath.    Cardiovascular: Positive for leg swelling. Negative for chest pain, palpitations and claudication.   Gastrointestinal: Positive for nausea. Negative for constipation, diarrhea and vomiting.   Genitourinary: Negative for dysuria, flank pain and urgency.   Musculoskeletal: Positive for myalgias. Negative for back pain.   Neurological: Positive for tingling and weakness. Negative for focal weakness and headaches.   Psychiatric/Behavioral: Negative for depression and memory loss. The patient is not nervous/anxious.       Physical Exam  Laboratory/Imaging   Hemodynamics  Temp (24hrs), Av.7 °C (98.1 °F), Min:36.4 °C (97.6 °F), Max:37 °C (98.6 °F)   Temperature: 36.6 °C (97.8 °F)  Pulse  Av.5  Min: 66  Max: 100    Blood Pressure : 132/61      Respiratory      Respiration: 20, Pulse Oximetry: 97 %     Work Of Breathing / Effort: Mild  RUL Breath Sounds: Clear, RML Breath Sounds: Clear, RLL Breath Sounds: Diminished, RAFY Breath Sounds: Clear, LLL Breath Sounds: Diminished    Fluids    Intake/Output Summary (Last 24 hours) at 10/23/18 1550  Last data filed at 10/23/18 1000   Gross per 24 hour   Intake             1028 ml   Output             2450 ml   Net            -1422 ml       Nutrition  Orders Placed This Encounter   Procedures   • Diet Order Regular, Diabetic     Standing Status:   Standing      Number of Occurrences:   1     Order Specific Question:   Diet:     Answer:   Regular [1]     Order Specific Question:   Diet:     Answer:   Diabetic [3]     Order Specific Question:   Calorie modifications:     Answer:   2000 kcals [5]     Physical Exam   Constitutional: He is oriented to person, place, and time. He appears well-nourished. No distress.   HENT:   Head: Normocephalic and atraumatic.   Nose: Nose normal.   Mouth/Throat: No oropharyngeal exudate.   Eyes: Pupils are equal, round, and reactive to light. EOM are normal.   Neck: Normal range of motion. Neck supple. No thyromegaly present.   Cardiovascular: Normal rate and intact distal pulses.    Pulmonary/Chest: Effort normal and breath sounds normal. No respiratory distress. He has no wheezes. He exhibits no tenderness.   Abdominal: Soft. Bowel sounds are normal. He exhibits no distension. There is no tenderness.   Musculoskeletal: He exhibits edema. He exhibits no tenderness.   Lower extremity edema, palpable pulses  Nontender to palpation   Neurological: He is alert and oriented to person, place, and time. No cranial nerve deficit. Coordination normal.   Skin: Skin is warm and dry. No rash noted. No erythema. No pallor.   Psychiatric: He has a normal mood and affect. His behavior is normal. Judgment and thought content normal.   Nursing note and vitals reviewed.      Recent Labs      10/21/18   0055  10/22/18   0117  10/23/18   0255   WBC  8.5  8.6  6.9   RBC  4.45*  4.23*  4.13*   HEMOGLOBIN  14.5  13.8*  13.4*   HEMATOCRIT  43.1  42.1  40.2*   MCV  96.9  99.5*  97.3   MCH  32.6  32.6  32.4   MCHC  33.6*  32.8*  33.3*   RDW  45.8  47.5  46.7   PLATELETCT  139*  110*  120*   MPV  9.9  10.1  10.0     Recent Labs      10/21/18   0055  10/22/18   0117  10/23/18   0255   SODIUM  136  134*  133*   POTASSIUM  4.6  3.9  4.1   CHLORIDE  103  102  102   CO2  24  24  23   GLUCOSE  269*  119*  156*   BUN  24*  18  18   CREATININE  1.16  1.07  1.02   CALCIUM  9.3   9.1  9.1     Recent Labs      10/21/18   0055   10/21/18   1533  10/22/18   0154  10/23/18   0255   APTT  27.7   < >  70.5*  54.8*  57.7*   INR  1.06   --    --    --    --     < > = values in this interval not displayed.                  Assessment/Plan     Ischemic leg- (present on admission)   Assessment & Plan    Right ischemic leg  S/p thrombectomy. Completion angiogram showed patent arterial system with PT dominant run-off.  Per surgery, rec starting eliquis Wednesday if no signs of bleed  Was on coumadin with subtherapeutic inr in the past  rx provided to  to check with insurance coverage  PT/OT - max assist    snf referral        Lactic acidosis- (present on admission)   Assessment & Plan    Related to ischemic leg        Obesity- (present on admission)   Assessment & Plan    Diet and exercise education        COPD (chronic obstructive pulmonary disease) (HCC)- (present on admission)   Assessment & Plan    Stable        Type 2 diabetes mellitus with circulatory disorder (HCC)- (present on admission)   Assessment & Plan    Holding oral medication  On sliding scale insulin        Paroxysmal atrial fibrillation (HCC)- (present on admission)   Assessment & Plan    Rate controlled  Continue metoprolol and digoxin  To restart warfarin after the procedure          Quality-Core Measures   Reviewed items::  Labs reviewed, Medications reviewed and Radiology images reviewed  DVT prophylaxis pharmacological::  Heparin  Ulcer Prophylaxis::  Not indicated  Assessed for rehabilitation services:  Patient was assess for and/or received rehabilitation services during this hospitalization         Arava Counseling:  Patient counseled regarding adverse effects of Arava including but not limited to nausea, vomiting, abnormalities in liver function tests. Patients may develop mouth sores, rash, diarrhea, and abnormalities in blood counts. The patient understands that monitoring is required including LFTs and blood counts.  There is a rare possibility of scarring of the liver and lung problems that can occur when taking methotrexate. Persistent nausea, loss of appetite, pale stools, dark urine, cough, and shortness of breath should be reported immediately. Patient advised to discontinue Arava treatment and consult with a physician prior to attempting conception. The patient will have to undergo a treatment to eliminate Arava from the body prior to conception.

## 2021-01-11 DIAGNOSIS — Z23 NEED FOR VACCINATION: ICD-10-CM

## 2022-12-14 NOTE — OP REPORT
DATE OF SERVICE:  10/21/2018    SURGEON:  Milagro Rodriguez MD    ANESTHESIOLOGIST:  Tadeo David MD    TYPE OF ANESTHESIA:  General anesthesia.    PREOPERATIVE DIAGNOSIS:  Critical right leg ischemia.    POSTOPERATIVE DIAGNOSIS:  Critical right leg ischemia.    PROCEDURES:  1.  Right common femoral, profunda femoral, superficial femoral, and popliteal   artery thrombectomy.  2.  Needle, right leg.  3.  Right leg angiogram.    INDICATION FOR PROCEDURE:  The patient is a pleasant 78-year-old male with   multiple medical problems including AFib, who was transferred from outside   hospital to [facility] with critical right leg ischemia.  Discussion was made with   patient and family members.  They would like for patient to undergo above   procedure, fully understanding all risks.    DESCRIPTION OF PROCEDURE:  Informed consent was obtained.  Patient was taken   to the operating room, was placed in the supine position.  He was given Ancef   intravenously.  General anesthesia was induced.  A Weber catheter was already   placed in the emergency room.  Next, his lower abdomen and both legs were   sterilely prepped and draped in the normal fashion.  A time-out procedure was   done.  An incision was made in the right groin, centering over the common   femoral artery.  The incision was extended through subcutaneous tissue using   the Harmonic scalpel.  Large amount of adipose tissue was encountered   secondary to patient's body habitus.  The visualized lymphatic system was   clipped with Hemoclips.    Next, the common femoral, profunda femoral, and superficial femoral artery were   identified and carefully dissected free from surrounding tissues.    Patient was given heparin 6000 units intravenously (he was already started on   heparin drip in the emergency room).  After the heparin was allowed to   circulate systemically for 3 minutes, vascular control of the proximal common   femoral artery was obtained with vascular clamps.   Head, normocephalic, atraumatic, Face, Face within normal limits, Ears, External ears within normal limits A transverse arteriotomy   was made on the distal common femoral artery.  There was organized and   semi-organized as well as fresh thrombus seen in the common femoral artery.    This was evacuated.  Next, thromboembolectomy of the right superficial femoral   and popliteal artery was performed using a #4 Abram thromboembolectomy   catheter.  Large amount of thrombus was evacuated.  The catheter was passed 2   more times.  No further clot was seen.  Good backbleeding was seen.  Vascular   control of the superficial femoral artery was obtained with vessel loops.    Next, thrombectomy of the profunda femoral artery was also performed using a   #4 Abram thromboembolectomy catheter.  Thrombosis was evacuated.  Excellent   backbleeding was seen.  Vascular control of the profunda femoral artery was   obtained with vessel loops.  The vascular clamp on the proximal common femoral   artery was temporary released and excellent inflow was seen with no clot   identified.    The arteriotomy was repaired with interrupted 5-0 Prolene sutures.  Prior to   completing the repair, backbleeding and flushing were obtained.  The repair was   completed.  Flow was first restored into the profunda and then into the   superficial femoral artery.    Next, the common femoral artery was cannulated using a 20-gauge angiocatheter.    Right leg angiogram was obtained.  There was prompt flow through the   superficial femoral and profunda femoral artery as well as popliteal artery.  There appeared to be 3-vessel   runoff seen with the posterior tibial artery being the dominant runoff vessel   and communicated well into the right foot.  The angiocatheter was removed.    The puncture site was closed with 6-0 Prolene suture.  The wound was  copiously irrigated with antibiotic solution and was found to have excellent   hemostasis.  The wound was close subcutaneously in layers with running 2-0 Vicryl sutures  And subcuticularly with 4-0 Monocryl  suture.  The wound was  cleaned and sterile vacuum dressing was applied.    ESTIMATED BLOOD LOSS:  50 mL.    INTRAVENOUS FLUID:  700 mL of crystalloids.    CONTRAST USED:  7.5 mL of Visipaque.    Sponge and instrument count was correct x2.    Patient was then awakened, extubated, taken to recovery area in stable   condition with palpable right posterior tibial pulses with multiphasic Doppler   flow signals.  His right leg compartments remain soft.       ____________________________________     MD TORRES XIONG / NTS    DD:  10/21/2018 05:07:39  DT:  10/21/2018 05:40:54    D#:  [id number]  Job#:  [id number]    cc: TITA MAYNARD MD, Hospitalist Service , Marlon العراقي MD

## 2024-06-18 NOTE — PROGRESS NOTES
Azithromycin Counseling:  I discussed with the patient the risks of azithromycin including but not limited to GI upset, allergic reaction, drug rash, diarrhea, and yeast infections. Subjective:      Neil De Souza is a 79 y.o. male who presents with Follow-Up (discuss pain management )        CC: Patient is here today accompanied by his daughter requesting referral to local pain management and for need of lab work.    HPI Neil De Souza        1. Chronic pain of both lower extremities  Patient just recently established with this clinic after moving from [place] to live with his daughter in [place].  He has history of ischemic leg which she was admitted to the hospital last year in October.  He was found to have blockage and underwent a thrombectomy of the right common femoral, profunda femoral, superficial femoral, and popliteal arteries by Dr. Rodriguez.  He states he still has chronic bilateral low leg pain and is currently on once a day low-dose Norco for this from his pain management doctor in [place].    2. Chronic pain of both shoulders  Patient also reports chronic pain in his shoulders bilaterally for which she has seen orthopedics in the past but currently is only treating with Norco.  His daughter and patient do not feel he is getting adequate pain management with the low-dose Norco which apparently was decreased over the last few months.  This problem is mostly at night when trying to sleep.    3. Paroxysmal atrial fibrillation (HCC)  Patient states he is taking his Eliquis, metoprolol and digoxin.  I did refer him in December to cardiology but his daughter states nobody has contacted him for an appointment as of yet.    4. Type 2 diabetes mellitus with other circulatory complication, with long-term current use of insulin (HCC)  His daughter states his blood sugars are typically running in the 200 range with his glimepiride, Humulin and Lantus insulin she states this is good for him.    5. Essential hypertension  Blood pressure has been well controlled up to now on lisinopril and metoprolol.    6. Dyslipidemia  Patient on Lipitor 40 mg and reports no myalgias  Social History   Substance Use  Dapsone Pregnancy And Lactation Text: This medication is Pregnancy Category C and is not considered safe during pregnancy or breast feeding. Topics   • Smoking status: Former Smoker     Types: Pipe, Cigars   • Smokeless tobacco: Former User   • Alcohol use No      Comment: hx alcohol abuse     Current Outpatient Prescriptions   Medication Sig Dispense Refill   • furosemide (LASIX) 40 MG Tab Take 40 mg by mouth every day.     • citalopram (CELEXA) 20 MG Tab Take 20 mg by mouth every day.     • tamsulosin (FLOMAX) 0.4 MG capsule Take 0.4 mg by mouth ONE-HALF HOUR AFTER BREAKFAST.     • atorvastatin (LIPITOR) 40 MG Tab Take 40 mg by mouth every evening.     • Metoprolol Succinate 25 MG Capsule ER 24 Hour Sprinkle Take  by mouth.     • apixaban (ELIQUIS) 5mg Tab Take 1 Tab by mouth 2 Times a Day. 60 Tab    • lisinopril (PRINIVIL) 20 MG Tab Take 1 Tab by mouth every day. 30 Tab    • docusate sodium 100 MG Cap Take 100 mg by mouth 2 Times a Day. 60 Cap    • insulin regular (HUMULIN R) 100 Unit/mL Solution Inject 2-9 Units as instructed 4 Times a Day,Before Meals and at Bedtime. 10 mL    • digoxin (LANOXIN) 125 MCG Tab Take 125 mcg by mouth every morning.     • finasteride (PROSCAR) 5 MG Tab Take 5 mg by mouth every morning.     • glimepiride (AMARYL) 4 MG Tab Take 4 mg by mouth 2 times a day.     • insulin glargine (LANTUS SOLOSTAR) 100 UNIT/ML Solution Pen-injector injection Inject 20 Units as instructed every evening.     • multivitamin (THERAGRAN) Tab Take 1 Tab by mouth every morning.     • omeprazole (PRILOSEC) 20 MG delayed-release capsule Take 40 mg by mouth every morning before breakfast.     • raNITidine (ZANTAC) 150 MG Tab Take 150 mg by mouth 2 times a day.     • traZODone (DESYREL) 50 MG Tab Take 50 mg by mouth at bedtime as needed for Sleep.     • albuterol (PROVENTIL) 2.5mg/0.5ml Nebu Soln 2.5 mg by Nebulization route every four hours as needed for Shortness of Breath.       No current facility-administered medications for this visit.      Family History   Problem Relation Age of Onset   • Hypertension Mother    • No Known Problems Father   "    Past Medical History:   Diagnosis Date   • Chronic obstructive pulmonary disease (HCC)    • Congestive heart failure (HCC)    • Diabetes (HCC)        Review of Systems   Musculoskeletal: Positive for joint pain and myalgias.   All other systems reviewed and are negative.         Objective:     /80 (BP Location: Right arm, Patient Position: Sitting)   Pulse 92   Temp 36.4 °C (97.6 °F) (Temporal)   Resp 18   Ht 1.753 m (5' 9\")   Wt 112.5 kg (248 lb)   SpO2 92%   BMI 36.62 kg/m²      Physical Exam   Constitutional: He is oriented to person, place, and time. He appears well-developed and well-nourished. No distress.   HENT:   Head: Normocephalic and atraumatic.   Right Ear: External ear normal.   Left Ear: External ear normal.   Nose: Nose normal.   Mouth/Throat: Oropharynx is clear and moist.   Eyes: Conjunctivae are normal. Right eye exhibits no discharge. Left eye exhibits no discharge.   Neck: Normal range of motion. Neck supple. No tracheal deviation present. No thyromegaly present.   Cardiovascular: Normal rate.  An irregularly irregular rhythm present.   Murmur heard.  No lower extremity edema or ulceration noted.   Pulmonary/Chest: Effort normal and breath sounds normal. No respiratory distress. He has no wheezes. He has no rales.   Musculoskeletal:        Right shoulder: He exhibits decreased range of motion.        Left shoulder: He exhibits decreased range of motion.   Lymphadenopathy:     He has no cervical adenopathy.   Neurological: He is alert and oriented to person, place, and time. Coordination normal.   Skin: Skin is warm and dry. No rash noted. He is not diaphoretic. No erythema.   Psychiatric: He has a normal mood and affect. His behavior is normal. Judgment and thought content normal.   Nursing note and vitals reviewed.              Assessment/Plan:     1. Chronic pain of both lower extremities  Patient would like to establish with local pain management to discuss treatment.  He " High Dose Vitamin A Counseling: Side effects reviewed, pt to contact office should one occur. Spironolactone Pregnancy And Lactation Text: This medication can cause feminization of the male fetus and should be avoided during pregnancy. The active metabolite is also found in breast milk. will continue to follow with Dr. Rodriguez in vascular medicine and he is continuing with his anticoagulation and statin.  - REFERRAL TO PAIN CLINIC    2. Chronic pain of both shoulders  Patient and his daughter would like him to establish with a local pain clinic to discuss treatment options for his chronic pain.  Currently he is only on once a day Norco 5 mg.    - REFERRAL TO PAIN CLINIC    3. Paroxysmal atrial fibrillation (HCC)  Patient and his daughter was given the information regarding the referral to cardiology made in December.    4. Type 2 diabetes mellitus with other circulatory complication, with long-term current use of insulin (HCC)  Patient will do lab work to see if medication changes are needed  - HEMOGLOBIN A1C; Future  - MICROALBUMIN CREAT RATIO URINE; Future    5. Essential hypertension  We will continue to monitor he is due for lab work.  - Comp Metabolic Panel; Future    6. Dyslipidemia  Patient states he is taking his Lipitor 40 mg.  - Lipid Profile; Future       Benzoyl Peroxide Counseling: Patient counseled that medicine may cause skin irritation and bleach clothing.  In the event of skin irritation, the patient was advised to reduce the amount of the drug applied or use it less frequently.   The patient verbalized understanding of the proper use and possible adverse effects of benzoyl peroxide.  All of the patient's questions and concerns were addressed. Topical Clindamycin Pregnancy And Lactation Text: This medication is Pregnancy Category B and is considered safe during pregnancy. It is unknown if it is excreted in breast milk. Azelaic Acid Counseling: Patient counseled that medicine may cause skin irritation and to avoid applying near the eyes.  In the event of skin irritation, the patient was advised to reduce the amount of the drug applied or use it less frequently.   The patient verbalized understanding of the proper use and possible adverse effects of azelaic acid.  All of the patient's questions and concerns were addressed. Birth Control Pills Pregnancy And Lactation Text: This medication should be avoided if pregnant and for the first 30 days post-partum. Isotretinoin Counseling: Patient should get monthly blood tests, not donate blood, not drive at night if vision affected, not share medication, and not undergo elective surgery for 6 months after tx completed. Side effects reviewed, pt to contact office should one occur. Sarecycline Pregnancy And Lactation Text: This medication is Pregnancy Category D and not consider safe during pregnancy. It is also excreted in breast milk. Detail Level: Detailed Bactrim Pregnancy And Lactation Text: This medication is Pregnancy Category D and is known to cause fetal risk.  It is also excreted in breast milk. Erythromycin Counseling:  I discussed with the patient the risks of erythromycin including but not limited to GI upset, allergic reaction, drug rash, diarrhea, increase in liver enzymes, and yeast infections. Include Pregnancy/Lactation Warning?: No Tazorac Pregnancy And Lactation Text: This medication is not safe during pregnancy. It is unknown if this medication is excreted in breast milk. Aklief counseling:  Patient advised to apply a pea-sized amount only at bedtime and wait 30 minutes after washing their face before applying.  If too drying, patient may add a non-comedogenic moisturizer.  The most commonly reported side effects including irritation, redness, scaling, dryness, stinging, burning, itching, and increased risk of sunburn.  The patient verbalized understanding of the proper use and possible adverse effects of retinoids.  All of the patient's questions and concerns were addressed. Topical Retinoid Pregnancy And Lactation Text: This medication is Pregnancy Category C. It is unknown if this medication is excreted in breast milk. Winlevi Counseling:  I discussed with the patient the risks of topical clascoterone including but not limited to erythema, scaling, itching, and stinging. Patient voiced their understanding. Azithromycin Pregnancy And Lactation Text: This medication is considered safe during pregnancy and is also secreted in breast milk. Doxycycline Counseling:  Patient counseled regarding possible photosensitivity and increased risk for sunburn.  Patient instructed to avoid sunlight, if possible.  When exposed to sunlight, patients should wear protective clothing, sunglasses, and sunscreen.  The patient was instructed to call the office immediately if the following severe adverse effects occur:  hearing changes, easy bruising/bleeding, severe headache, or vision changes.  The patient verbalized understanding of the proper use and possible adverse effects of doxycycline.  All of the patient's questions and concerns were addressed. High Dose Vitamin A Pregnancy And Lactation Text: High dose vitamin A therapy is contraindicated during pregnancy and breast feeding. Benzoyl Peroxide Pregnancy And Lactation Text: This medication is Pregnancy Category C. It is unknown if benzoyl peroxide is excreted in breast milk. Topical Sulfur Applications Counseling: Topical Sulfur Counseling: Patient counseled that this medication may cause skin irritation or allergic reactions.  In the event of skin irritation, the patient was advised to reduce the amount of the drug applied or use it less frequently.   The patient verbalized understanding of the proper use and possible adverse effects of topical sulfur application.  All of the patient's questions and concerns were addressed. Tetracycline Counseling: Patient counseled regarding possible photosensitivity and increased risk for sunburn.  Patient instructed to avoid sunlight, if possible.  When exposed to sunlight, patients should wear protective clothing, sunglasses, and sunscreen.  The patient was instructed to call the office immediately if the following severe adverse effects occur:  hearing changes, easy bruising/bleeding, severe headache, or vision changes.  The patient verbalized understanding of the proper use and possible adverse effects of tetracycline.  All of the patient's questions and concerns were addressed. Patient understands to avoid pregnancy while on therapy due to potential birth defects. Dapsone Counseling: I discussed with the patient the risks of dapsone including but not limited to hemolytic anemia, agranulocytosis, rashes, methemoglobinemia, kidney failure, peripheral neuropathy, headaches, GI upset, and liver toxicity.  Patients who start dapsone require monitoring including baseline LFTs and weekly CBCs for the first month, then every month thereafter.  The patient verbalized understanding of the proper use and possible adverse effects of dapsone.  All of the patient's questions and concerns were addressed. Isotretinoin Pregnancy And Lactation Text: This medication is Pregnancy Category X and is considered extremely dangerous during pregnancy. It is unknown if it is excreted in breast milk. Spironolactone Counseling: Patient advised regarding risks of diarrhea, abdominal pain, hyperkalemia, birth defects (for female patients), liver toxicity and renal toxicity. The patient may need blood work to monitor liver and kidney function and potassium levels while on therapy. The patient verbalized understanding of the proper use and possible adverse effects of spironolactone.  All of the patient's questions and concerns were addressed. Azelaic Acid Pregnancy And Lactation Text: This medication is considered safe during pregnancy and breast feeding. Topical Clindamycin Counseling: Patient counseled that this medication may cause skin irritation or allergic reactions.  In the event of skin irritation, the patient was advised to reduce the amount of the drug applied or use it less frequently.   The patient verbalized understanding of the proper use and possible adverse effects of clindamycin.  All of the patient's questions and concerns were addressed. Birth Control Pills Counseling: Birth Control Pill Counseling: I discussed with the patient the potential side effects of OCPs including but not limited to increased risk of stroke, heart attack, thrombophlebitis, deep venous thrombosis, hepatic adenomas, breast changes, GI upset, headaches, and depression.  The patient verbalized understanding of the proper use and possible adverse effects of OCPs. All of the patient's questions and concerns were addressed. Erythromycin Pregnancy And Lactation Text: This medication is Pregnancy Category B and is considered safe during pregnancy. It is also excreted in breast milk. Sarecycline Counseling: Patient advised regarding possible photosensitivity and discoloration of the teeth, skin, lips, tongue and gums.  Patient instructed to avoid sunlight, if possible.  When exposed to sunlight, patients should wear protective clothing, sunglasses, and sunscreen.  The patient was instructed to call the office immediately if the following severe adverse effects occur:  hearing changes, easy bruising/bleeding, severe headache, or vision changes.  The patient verbalized understanding of the proper use and possible adverse effects of sarecycline.  All of the patient's questions and concerns were addressed. Tazorac Counseling:  Patient advised that medication is irritating and drying.  Patient may need to apply sparingly and wash off after an hour before eventually leaving it on overnight.  The patient verbalized understanding of the proper use and possible adverse effects of tazorac.  All of the patient's questions and concerns were addressed. Aklief Pregnancy And Lactation Text: It is unknown if this medication is safe to use during pregnancy.  It is unknown if this medication is excreted in breast milk.  Breastfeeding women should use the topical cream on the smallest area of the skin for the shortest time needed while breastfeeding.  Do not apply to nipple and areola. Doxycycline Pregnancy And Lactation Text: This medication is Pregnancy Category D and not consider safe during pregnancy. It is also excreted in breast milk but is considered safe for shorter treatment courses. Winlevi Pregnancy And Lactation Text: This medication is considered safe during pregnancy and breastfeeding. Bactrim Counseling:  I discussed with the patient the risks of sulfa antibiotics including but not limited to GI upset, allergic reaction, drug rash, diarrhea, dizziness, photosensitivity, and yeast infections.  Rarely, more serious reactions can occur including but not limited to aplastic anemia, agranulocytosis, methemoglobinemia, blood dyscrasias, liver or kidney failure, lung infiltrates or desquamative/blistering drug rashes. Topical Retinoid counseling:  Patient advised to apply a pea-sized amount only at bedtime and wait 30 minutes after washing their face before applying.  If too drying, patient may add a non-comedogenic moisturizer. The patient verbalized understanding of the proper use and possible adverse effects of retinoids.  All of the patient's questions and concerns were addressed. Topical Sulfur Applications Pregnancy And Lactation Text: This medication is Pregnancy Category C and has an unknown safety profile during pregnancy. It is unknown if this topical medication is excreted in breast milk. Minocycline Counseling: Patient advised regarding possible photosensitivity and discoloration of the teeth, skin, lips, tongue and gums.  Patient instructed to avoid sunlight, if possible.  When exposed to sunlight, patients should wear protective clothing, sunglasses, and sunscreen.  The patient was instructed to call the office immediately if the following severe adverse effects occur:  hearing changes, easy bruising/bleeding, severe headache, or vision changes.  The patient verbalized understanding of the proper use and possible adverse effects of minocycline.  All of the patient's questions and concerns were addressed. [facility] Smokers Quitline ([id number])

## (undated) DEVICE — GELAQUASONIC 100 ULTRASOUND - 48/BX 20GM STERILE FOIL POUCH

## (undated) DEVICE — SYRINGE 20 ML LL (50EA/BX 4BX/CA)

## (undated) DEVICE — NEEDLE NON SAFETY HYPO 22 GA X 1 1/2 IN (100/BX)

## (undated) DEVICE — KIT RADIAL ARTERY 20GA W/MAX BARRIER AND BIOPATCH  (5EA/CA) #10740 IS FOR THE SET RADIAL ARTERIAL

## (undated) DEVICE — SHEAR HS FOCUS 9CM CVD - (6/BX)

## (undated) DEVICE — SET LEADWIRE 5 LEAD BEDSIDE DISPOSABLE ECG (1SET OF 5/EA)

## (undated) DEVICE — SYRINGE 30 ML LL (56/BX)

## (undated) DEVICE — PAD PREP 24 X 48 CUFFED - (100/CA)

## (undated) DEVICE — INSTRUMENT JAWCOVER SUTURE - BOOTS (5PK/BX)

## (undated) DEVICE — LACTATED RINGERS INJ 1000 ML - (14EA/CA 60CA/PF)

## (undated) DEVICE — CHLORAPREP 26 ML APPLICATOR - ORANGE TINT(25/CA)

## (undated) DEVICE — SUTURE 5-0 PROLENE RB-1 D/A 36 (36PK/BX)"

## (undated) DEVICE — TUBE E-T HI-LO CUFF 8.0MM (10EA/PK)

## (undated) DEVICE — SUTURE 2-0 SILK 12 X 18" (36PK/BX)"

## (undated) DEVICE — SODIUM CHL. INJ. 0.9% 500ML (24EA/CA 50CA/PF)

## (undated) DEVICE — SUTURE 3-0 VICRYL PLUS SH - 27 INCH (36/BX)

## (undated) DEVICE — CATHETER EMBOLECTOMY 4FR (5EA/CA)

## (undated) DEVICE — COVER LIGHT HANDLE FLEXIBLE - SOFT (2EA/PK 80PK/CA)

## (undated) DEVICE — BAG ISOLATION 20X20 INVISI - SHEILD (10EA/BX)

## (undated) DEVICE — MASK ANESTHESIA ADULT  - (100/CA)

## (undated) DEVICE — SENSOR SPO2 NEO LNCS ADHESIVE (20/BX) SEE USER NOTES

## (undated) DEVICE — GOWN SURGICAL X-LARGE ULTRA - FILM-REINFORCED (20/CA)

## (undated) DEVICE — SUTURE 4-0 MONOCRYL PLUS PS-1 - 27 INCH (36/BX)

## (undated) DEVICE — SUTURE 5-0 PROLENE C-1 D/A 24 (36PK/BX)"

## (undated) DEVICE — GLOVE BIOGEL INDICATOR SZ 7.5 SURGICAL PF LTX - (50PR/BX 4BX/CA)

## (undated) DEVICE — SYRINGE SAFETY 10 ML 18 GA X 1 1/2 BLUNT LL (100/BX 4BX/CA)

## (undated) DEVICE — SUTURE CV

## (undated) DEVICE — SYRINGE SAFETY 5 ML 18 GA X 1-1/2 BLUNT LL (100/BX 4BX/CA)

## (undated) DEVICE — GLOVE BIOGEL PI INDICATOR SZ 7.0 SURGICAL PF LF - (50/BX 4BX/CA)

## (undated) DEVICE — NEPTUNE 4 PORT MANIFOLD - (20/PK)

## (undated) DEVICE — ELECTRODE DUAL RETURN W/ CORD - (50/PK)

## (undated) DEVICE — GLOVE BIOGEL SZ 7 SURGICAL PF LTX - (50PR/BX 4BX/CA)

## (undated) DEVICE — STAPLER SKIN DISP - (6/BX 10BX/CA) VISISTAT

## (undated) DEVICE — SYRINGE DISP. 12 CC LL - (100/BX)

## (undated) DEVICE — SLEEVE, VASO, THIGH, MED

## (undated) DEVICE — DRAPE SURGICAL U 77X120 - (10/CA)

## (undated) DEVICE — SET EXTENSION 31IN APPX 3.2ML WITH CLAMP (50/CA)WAS 4610-03

## (undated) DEVICE — PACK MINOR BASIN - (2EA/CA)

## (undated) DEVICE — BAG DECANTER (50EA/CS)

## (undated) DEVICE — SET FLUID WARMING STANDARD FLOW - (10/CA)

## (undated) DEVICE — SPONGE XRAY 8X4 STERL. 12PL - (10EA/TY 80TY/CA)

## (undated) DEVICE — SUTURE 2-0 VICRYL PLUS CT-1 36 (36PK/BX)"

## (undated) DEVICE — ELECTRODE 850 FOAM ADHESIVE - HYDROGEL RADIOTRNSPRNT (50/PK)

## (undated) DEVICE — VESSELOOP MAXI BLUE STERILE- SURG-I-LOOP (10EA/BX)

## (undated) DEVICE — TRANSDUCER ADULT DISP. SINGLE BONDED STERILE - (20EA/CA)

## (undated) DEVICE — PROTECTOR ULNA NERVE - (36PR/CA)

## (undated) DEVICE — SUCTION INSTRUMENT YANKAUER BULBOUS TIP W/O VENT (50EA/CA)

## (undated) DEVICE — PACK MAJOR BASIN - (2EA/CA)

## (undated) DEVICE — SOD. CHL. INJ. 0.9% 1000 ML - (14EA/CA 60CA/PF)

## (undated) DEVICE — SUTURE 5-0 PROLENE BV-1 HEMOSEAL (36PK/BX)

## (undated) DEVICE — CONTAINER SPECIMEN BAG OR - STERILE 4 OZ W/LID (100EA/CA)

## (undated) DEVICE — SUTURE 6-0 PROLENE BV-1 D/A 30 (36PK/BX)"

## (undated) DEVICE — SYRINGE 10 ML CONTROL LL (25EA/BX 4BX/CA)

## (undated) DEVICE — GOWN WARMING STANDARD FLEX - (30/CA)

## (undated) DEVICE — SUTURE 6-0 PROLENE BV-1 D/A 24 (36PK/BX)"

## (undated) DEVICE — TRAY, CV CATH MULTI-LUM.AK-25703

## (undated) DEVICE — VESSELOOP MINI BLUE STERILE - SURG-I-LOOP (10EA/BX)

## (undated) DEVICE — GOWN SURGEONS X-LARGE - DISP. (30/CA)

## (undated) DEVICE — SUTURE GENERAL

## (undated) DEVICE — TUBING CLEARLINK DUO-VENT - C-FLO (48EA/CA)

## (undated) DEVICE — BLADE SURGICAL #11 - (50/BX)

## (undated) DEVICE — BLADE SURGICAL #15 - (50/BX 3BX/CA)

## (undated) DEVICE — CANISTER SUCTION 3000ML MECHANICAL FILTER AUTO SHUTOFF MEDI-VAC NONSTERILE LF DISP  (40EA/CA)

## (undated) DEVICE — SYSTEM PREVENA INCISION MNGM - (1/EA)

## (undated) DEVICE — SODIUM CHL IRRIGATION 0.9% 1000ML (12EA/CA)

## (undated) DEVICE — DRAPE LARGE 3 QUARTER - (20/CA)

## (undated) DEVICE — KIT ANESTHESIA W/CIRCUIT & 3/LT BAG W/FILTER (20EA/CA)

## (undated) DEVICE — SUTURE 4-0 MONOCRYL PLUS PS-2 - 27 INCH (36/BX)

## (undated) DEVICE — DRAPE IOBAN II INCISE 23X17 - (10EA/BX 4BX/CA)

## (undated) DEVICE — CLIP SM INTNL HRZN TI ESCP LGT - (24EA/PK 25PK/BX)

## (undated) DEVICE — DISPOSABLE WOUND VAC PICO 10 X 20 CM - WOUND CARE (3/CA)

## (undated) DEVICE — SUTURE 5-0 PROLENE C-1 18 (36PK/BX)"

## (undated) DEVICE — SET EXTENSION WITH 2 PORTS (48EA/CA) ***PART #2C8610 IS A SUBSTITUTE*****

## (undated) DEVICE — KIT ROOM DECONTAMINATION

## (undated) DEVICE — TOWELS CLOTH SURGICAL - (4/PK 20PK/CA)

## (undated) DEVICE — SUTURE 3-0 VICRYL PLUS SH - 8X 18 INCH (12/BX)

## (undated) DEVICE — ADHESIVE DERMABOND HVD MINI (12EA/BX)

## (undated) DEVICE — HEAD HOLDER JUNIOR/ADULT

## (undated) DEVICE — STERI STRIP COMPOUND BENZOIN - TINCTURE 0.6ML WITH APPLICATOR (40EA/BX)

## (undated) DEVICE — CLIP MED INTNL HRZN TI ESCP - (25/BX)

## (undated) DEVICE — DERMABOND ADVANCED - (12EA/BX)

## (undated) DEVICE — SET BIFURCATED BLOOD - (48EA/CS)